# Patient Record
Sex: MALE | Race: BLACK OR AFRICAN AMERICAN | Employment: STUDENT | URBAN - METROPOLITAN AREA
[De-identification: names, ages, dates, MRNs, and addresses within clinical notes are randomized per-mention and may not be internally consistent; named-entity substitution may affect disease eponyms.]

---
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
## 2019-06-05 ENCOUNTER — TELEPHONE (OUTPATIENT)
Dept: FAMILY MEDICINE CLINIC | Facility: CLINIC | Age: 19
End: 2019-06-05

## 2019-06-05 NOTE — TELEPHONE ENCOUNTER
Called pt to inform of no show he said he was running late would be another 10-15mins, pt will call back to rsch

## 2020-05-21 ENCOUNTER — TELEPHONE (OUTPATIENT)
Dept: FAMILY MEDICINE CLINIC | Facility: CLINIC | Age: 20
End: 2020-05-21

## 2020-05-21 ENCOUNTER — APPOINTMENT (OUTPATIENT)
Dept: GENERAL RADIOLOGY | Age: 20
End: 2020-05-21
Attending: FAMILY MEDICINE
Payer: COMMERCIAL

## 2020-05-21 ENCOUNTER — HOSPITAL ENCOUNTER (OUTPATIENT)
Age: 20
Discharge: HOME | End: 2020-05-21
Attending: FAMILY MEDICINE
Payer: COMMERCIAL

## 2020-05-21 VITALS
RESPIRATION RATE: 16 BRPM | WEIGHT: 170 LBS | HEART RATE: 49 BPM | BODY MASS INDEX: 25.18 KG/M2 | DIASTOLIC BLOOD PRESSURE: 76 MMHG | TEMPERATURE: 98 F | HEIGHT: 69 IN | OXYGEN SATURATION: 100 % | SYSTOLIC BLOOD PRESSURE: 120 MMHG

## 2020-05-21 DIAGNOSIS — M79.89 SWELLING OF THIGH: ICD-10-CM

## 2020-05-21 DIAGNOSIS — S76.309A HAMSTRING INJURY, INITIAL ENCOUNTER: Primary | ICD-10-CM

## 2020-05-21 PROCEDURE — 99203 OFFICE O/P NEW LOW 30 MIN: CPT

## 2020-05-21 PROCEDURE — 73502 X-RAY EXAM HIP UNI 2-3 VIEWS: CPT | Performed by: FAMILY MEDICINE

## 2020-05-21 PROCEDURE — 73552 X-RAY EXAM OF FEMUR 2/>: CPT | Performed by: FAMILY MEDICINE

## 2020-05-21 PROCEDURE — 99214 OFFICE O/P EST MOD 30 MIN: CPT

## 2020-05-21 NOTE — TELEPHONE ENCOUNTER
Stuck leg out to block a ball - playing soccer morning - this happen about 10 AM this morning. Pt heard a loud pop. Could not move or walk after that. Having a hard time getting around.    He is applying ice to the leg and also elevating it  Taking ibupro

## 2020-05-21 NOTE — TELEPHONE ENCOUNTER
Pt left a voicemail asking for an appointment. He thinks he torn his hamstring.  (no appointments left for Dr Leslie Lewis today. Please advise.

## 2020-05-21 NOTE — ED PROVIDER NOTES
Patient Seen in: [facility] [place]      History   Patient presents with:  Leg Pain    Stated Complaint: leg pain    HPI  This is a 16-year-old male coming in with complaints of hearing a/feeling a pop with posterior right leg / thigh - distress  SKIN: no rashes,no suspicious lesions noted over the exposed areas of skin   HEENT: atraumatic, normocephalic  NECK: supple  LUNGS: No tachypnea   CARDIO: No tachycardia   GI: not distended     EXTREMITIES:   R leg / thigh exam:   - defect/deform some tingling to the foot.                           Order Specific Question: What is the Relevant Clinical Indication / Reason for Exam?          Answer: leg pain      Apply ace wrap Once      Crutch walk training          Order Comments:              Crut to evaluate the hamstrings further. Patient verbalized understanding and agreed with the plan. MDM     Your XRs were normal - no bony abnormality noted. Remove the ace wrap at bed time or may loosely wrap the muscle at night time.  Avoid beari

## 2020-05-21 NOTE — ED INITIAL ASSESSMENT (HPI)
Paramjit bustillo states around  he was playing soccer with friends when he did the splits and both heard and felt a pop to the posterior right leg. He has a lot of pain with trying to bend the leg and decreased ROM.   He took advil 400mg around 1130am.  He

## 2020-05-26 ENCOUNTER — OFFICE VISIT (OUTPATIENT)
Dept: FAMILY MEDICINE CLINIC | Facility: CLINIC | Age: 20
End: 2020-05-26

## 2020-05-26 VITALS
HEART RATE: 57 BPM | HEIGHT: 69 IN | DIASTOLIC BLOOD PRESSURE: 68 MMHG | TEMPERATURE: 98 F | OXYGEN SATURATION: 98 % | SYSTOLIC BLOOD PRESSURE: 110 MMHG | WEIGHT: 171 LBS | BODY MASS INDEX: 25.33 KG/M2 | RESPIRATION RATE: 18 BRPM

## 2020-05-26 DIAGNOSIS — S76.301D RIGHT HAMSTRING INJURY, SUBSEQUENT ENCOUNTER: Primary | ICD-10-CM
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
## 2020-05-26 PROBLEM — S76.301A RIGHT HAMSTRING INJURY: Status: ACTIVE | Noted: 2020-05-26

## 2020-05-26 PROCEDURE — 99213 OFFICE O/P EST LOW 20 MIN: CPT | Performed by: FAMILY MEDICINE

## 2020-05-26 NOTE — PROGRESS NOTES
Theresa Rodriguez is a 20year old male. Patient presents with:  Lower Extremity Injury: right    HPI:   Theresa Rodriguez is a 20year old male seen for follow up from urgent care.     Patient states was playing soccer 5/21/2020 and was trying to block his friend wit thigh with mild tenderness to palpation. Knee extension and flexion is restricted due to pain  Hip flexion and extension is restricted due to pain. ASSESSMENT AND PLAN:   Chris Gross was seen today for lower extremity injury.     Diagnoses and all orders for t

## 2020-05-28 ENCOUNTER — HOSPITAL ENCOUNTER (OUTPATIENT)
Dept: MRI IMAGING | Facility: HOSPITAL | Age: 20
Discharge: HOME | End: 2020-05-28
Attending: FAMILY MEDICINE
Payer: COMMERCIAL

## 2020-05-28 DIAGNOSIS — S76.301D RIGHT HAMSTRING INJURY, SUBSEQUENT ENCOUNTER: ICD-10-CM
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
## 2020-05-28 PROCEDURE — 73718 MRI LOWER EXTREMITY W/O DYE: CPT | Performed by: FAMILY MEDICINE

## 2020-06-01 ENCOUNTER — OFFICE VISIT (OUTPATIENT)
Dept: ORTHOPEDICS CLINIC | Facility: CLINIC | Age: 20
End: 2020-06-01

## 2020-06-01 VITALS
HEART RATE: 60 BPM | OXYGEN SATURATION: 99 % | HEIGHT: 69 IN | RESPIRATION RATE: 16 BRPM | WEIGHT: 171 LBS | BODY MASS INDEX: 25.33 KG/M2

## 2020-06-01 DIAGNOSIS — S76.311A COMPLETE PROXIMAL HAMSTRING TENDON RUPTURE, RIGHT, INITIAL ENCOUNTER: Primary | ICD-10-CM

## 2020-06-01 DIAGNOSIS — M25.551 PAIN IN JOINT OF RIGHT HIP: ICD-10-CM

## 2020-06-01 PROCEDURE — 99203 OFFICE O/P NEW LOW 30 MIN: CPT | Performed by: NURSE PRACTITIONER

## 2020-06-01 NOTE — H&P
[facility] New Patient Note    CC: Patient presents with:  Consult: right hamstring injury. heard a pop. 1.5 weeks ago. denies radiation. denies numbness or tingling. RICE helps with pain. no longer taking NSAIDS.         HPI: This 20year old male pr tuberosity. There is swelling and bruising over the proximal hamstring with a negative straight leg raise, and he is neurovascular intact distally.     IMAGING  I personally reviewed his x-rays and his MRI today's x-rays were negative for any acute process

## 2020-06-02 ENCOUNTER — OFFICE VISIT (OUTPATIENT)
Dept: PHYSICAL THERAPY | Facility: HOSPITAL | Age: 20
End: 2020-06-02
Attending: FAMILY MEDICINE
Payer: COMMERCIAL

## 2020-06-02 DIAGNOSIS — S76.301D RIGHT HAMSTRING INJURY, SUBSEQUENT ENCOUNTER: ICD-10-CM

## 2020-06-02 PROCEDURE — 97110 THERAPEUTIC EXERCISES: CPT

## 2020-06-02 PROCEDURE — 97161 PT EVAL LOW COMPLEX 20 MIN: CPT

## 2020-06-02 NOTE — PROGRESS NOTES
LOWER EXTREMITY EVALUATION:   Referring Physician: Dr. Du Rowe  Diagnosis: R Hamstring Injury     Date of Service: 6/2/2020     PATIENT SUMMARY   Julissa Ortiz is a 20year old male who presents to therapy today with complaints of right leg pain followin you tried to hurt yourself in the past?  No        Juan M Zuniga describes prior level of function no history of HS injury, pt was able to play collegiate soccer. Pt goals include be able to flex hip, improve strength, play soccer.   Past medical history was reviewe 5/5; L 5/5 Flexion: R 3/5; L 5/5  Extension: R 5/5; L 5/5        Special tests:   SLR - for radicular symptoms, localized pulling in HS with this  Pt is unable to raise hip into extension against gravity  Lumbar screen: trunk flexion to 70, pulling in LEs Manual Therapy, Neuromuscular Re-education, Therapeutic Activities, Therapeutic Exercise, Home Exercise Program instruction and Modalities to include: ice    Education or treatment limitation: None  Rehab Potential:good    Patient/Family/Caregiver was advi

## 2020-06-04 ENCOUNTER — APPOINTMENT (OUTPATIENT)
Dept: PHYSICAL THERAPY | Facility: HOSPITAL | Age: 20
End: 2020-06-04
Attending: FAMILY MEDICINE
Payer: COMMERCIAL

## 2020-06-05 ENCOUNTER — OFFICE VISIT (OUTPATIENT)
Dept: PHYSICAL THERAPY | Facility: HOSPITAL | Age: 20
End: 2020-06-05
Attending: FAMILY MEDICINE
Payer: COMMERCIAL

## 2020-06-05 PROCEDURE — 97110 THERAPEUTIC EXERCISES: CPT

## 2020-06-05 NOTE — PROGRESS NOTES
Dx: R Hamstring Injury         Insurance (Authorized # of Visits):  12           Authorizing Physician: Dr. Shell Bowen  Next MD visit: none scheduled  Fall Risk: standard         Precautions: n/a             Subjective:  The patient states that he is now able R hip extension, 2x8  Bridge standard 10x, progressed to bridge with march 2x12  Reviewed mini squat 10x       X       HEP:Access Code: [email or web address]   Prone Knee Flexion - 10 reps - 3 sets - 1x daily - 7x weekly  Sidelying Hip Extension in Abduction - 8 reps -

## 2020-06-09 ENCOUNTER — OFFICE VISIT (OUTPATIENT)
Dept: PHYSICAL THERAPY | Facility: HOSPITAL | Age: 20
End: 2020-06-09
Attending: FAMILY MEDICINE
Payer: COMMERCIAL

## 2020-06-09 DIAGNOSIS — S76.301D RIGHT HAMSTRING INJURY, SUBSEQUENT ENCOUNTER: ICD-10-CM

## 2020-06-09 PROCEDURE — 97110 THERAPEUTIC EXERCISES: CPT

## 2020-06-09 NOTE — PROGRESS NOTES
Dx: R Hamstring Injury         Insurance (Authorized # of Visits):  12           Authorizing Physician: Dr. Nish Jorge  Next MD visit: none scheduled  Fall Risk: standard         Precautions: n/a             Subjective:  The patient reports that he has been w treadmill 2 mph, no elevation 3 min  Prone glute max hip extension 10x  Prone hip extension 2x, DC due to pain  Prone HS curl no resistance 3x10  Swiss ball DKTC 15x, slow eccentric control  sidelying R hip extension, 2x8  Bridge standard 10x, progressed t

## 2020-06-11 ENCOUNTER — OFFICE VISIT (OUTPATIENT)
Dept: PHYSICAL THERAPY | Facility: HOSPITAL | Age: 20
End: 2020-06-11
Attending: FAMILY MEDICINE
Payer: COMMERCIAL

## 2020-06-11 DIAGNOSIS — S76.301D RIGHT HAMSTRING INJURY, SUBSEQUENT ENCOUNTER: ICD-10-CM

## 2020-06-11 PROCEDURE — 97140 MANUAL THERAPY 1/> REGIONS: CPT

## 2020-06-11 PROCEDURE — 97110 THERAPEUTIC EXERCISES: CPT

## 2020-06-11 NOTE — PROGRESS NOTES
Dx: R Hamstring Injury         Insurance (Authorized # of Visits):  12           Authorizing Physician: Dr. Leonard Harmon  Next MD visit: none scheduled  Fall Risk: standard         Precautions: n/a             Subjective:  The patient states that he is still ha 6/11/2020            TX#: 4/16 Date:                 TX#: 5/ Date:    Tx#: 6/   There ex  Ambulation on treadmill 2 mph, no elevation 3 min  Prone glute max hip extension 10x  Prone hip extension 2x, DC due to pain  Prone HS curl no resistance 3x10  Swiss b

## 2020-06-16 ENCOUNTER — OFFICE VISIT (OUTPATIENT)
Dept: PHYSICAL THERAPY | Facility: HOSPITAL | Age: 20
End: 2020-06-16
Attending: FAMILY MEDICINE
Payer: COMMERCIAL

## 2020-06-16 DIAGNOSIS — S76.301D RIGHT HAMSTRING INJURY, SUBSEQUENT ENCOUNTER: ICD-10-CM

## 2020-06-16 PROCEDURE — 97140 MANUAL THERAPY 1/> REGIONS: CPT

## 2020-06-16 PROCEDURE — 97110 THERAPEUTIC EXERCISES: CPT

## 2020-06-16 NOTE — PROGRESS NOTES
Dx: R Hamstring Injury         Insurance (Authorized # of Visits):  12           Authorizing Physician: Dr. Lisbeth Bonds  Next MD visit: none scheduled  Fall Risk: standard         Precautions: n/a             Subjective:  The patient reports that he is feeling elevation 3 min  Prone glute max hip extension 10x  Prone hip extension 2x, DC due to pain  Prone HS curl no resistance 3x10  Swiss ball DKTC 15x, slow eccentric control  sidelying R hip extension, 2x8  Bridge standard 10x, progressed to bridge with march

## 2020-06-18 ENCOUNTER — OFFICE VISIT (OUTPATIENT)
Dept: PHYSICAL THERAPY | Facility: HOSPITAL | Age: 20
End: 2020-06-18
Attending: FAMILY MEDICINE
Payer: COMMERCIAL

## 2020-06-18 DIAGNOSIS — S76.301D RIGHT HAMSTRING INJURY, SUBSEQUENT ENCOUNTER: ICD-10-CM

## 2020-06-18 PROCEDURE — 97110 THERAPEUTIC EXERCISES: CPT

## 2020-06-18 PROCEDURE — 97140 MANUAL THERAPY 1/> REGIONS: CPT

## 2020-06-18 NOTE — PROGRESS NOTES
Dx: R Hamstring Injury         Insurance (Authorized # of Visits):  12           Authorizing Physician: Dr. Sarah Jason MD visit: none scheduled  Fall Risk: standard         Precautions: n/a             Subjective:  The patient states that he has been fe safely    Plan: continue RDL  Date: 6/5/2020  TX#: 2/16 Date: 6/9/2020               TX#: 3/16 Date: 6/11/2020            TX#: 4/16 Date: 6/16/2020            TX#: 5/16 Date: 6/18/2020   Tx#: 6/16   There ex  Ambulation on treadmill 2 mph, no elevation 3 m weekly- modified range  Sidelying Hip Extension in Abduction - 8 reps - 3 sets - 1x daily - 7x weekly  Single Leg Bridge - 8 reps - 3 sets - 1x daily - 7x weekly- modified to 3 sets of 6  Mini Squat with Counter Support - 10 reps - 2 sets - 1x daily - 7x w

## 2020-06-23 ENCOUNTER — APPOINTMENT (OUTPATIENT)
Dept: PHYSICAL THERAPY | Facility: HOSPITAL | Age: 20
End: 2020-06-23
Attending: FAMILY MEDICINE
Payer: COMMERCIAL

## 2020-06-25 ENCOUNTER — OFFICE VISIT (OUTPATIENT)
Dept: PHYSICAL THERAPY | Facility: HOSPITAL | Age: 20
End: 2020-06-25
Attending: FAMILY MEDICINE
Payer: COMMERCIAL

## 2020-06-25 DIAGNOSIS — S76.301D RIGHT HAMSTRING INJURY, SUBSEQUENT ENCOUNTER: ICD-10-CM

## 2020-06-25 PROCEDURE — 97140 MANUAL THERAPY 1/> REGIONS: CPT

## 2020-06-25 PROCEDURE — 97110 THERAPEUTIC EXERCISES: CPT

## 2020-06-25 NOTE — PROGRESS NOTES
Detail Level: Detailed Progress Summary  Pt has attended 7 visits in Physical Therapy.      Dx: R Hamstring Injury         Insurance (Authorized # of Visits):  12           Authorizing Physician: Dr. James Lee  Next MD visit: none scheduled  Fall Risk: standard         Precaution Add 53370 Cpt? (Important Note: In 2017 The Use Of 95571 Is Being Tracked By Cms To Determine Future Global Period Reimbursement For Global Periods): yes patient will demonstrate prone knee flexion strength on R that is 5/5  PROGRESSING  4. The patient will demonstrate the ability to flex lumbar spine to at least 90 deg in standing with no increase in pain NOT TESTED  5.  The patient will demonstrate normal pain     There ex  Bike 5 min L3  sidelying adduction no resistance 10x on L, 3x on R  Sumo squat 2x12  SL bridge 2x8 ea  Tried hip extension lean on table, DC due to pain  Standing hip extension partial range no resistance, 2x10 ea  BOSU step ups flat emily

## 2020-06-29 ENCOUNTER — OFFICE VISIT (OUTPATIENT)
Dept: ORTHOPEDICS CLINIC | Facility: CLINIC | Age: 20
End: 2020-06-29

## 2020-06-29 ENCOUNTER — OFFICE VISIT (OUTPATIENT)
Dept: PHYSICAL THERAPY | Facility: HOSPITAL | Age: 20
End: 2020-06-29
Attending: FAMILY MEDICINE
Payer: COMMERCIAL

## 2020-06-29 VITALS — HEART RATE: 68 BPM | OXYGEN SATURATION: 97 % | RESPIRATION RATE: 16 BRPM

## 2020-06-29 DIAGNOSIS — S76.311A COMPLETE PROXIMAL HAMSTRING TENDON RUPTURE, RIGHT, INITIAL ENCOUNTER: Primary | ICD-10-CM

## 2020-06-29 PROCEDURE — 99213 OFFICE O/P EST LOW 20 MIN: CPT | Performed by: NURSE PRACTITIONER

## 2020-06-29 PROCEDURE — 97140 MANUAL THERAPY 1/> REGIONS: CPT

## 2020-06-29 PROCEDURE — 97110 THERAPEUTIC EXERCISES: CPT

## 2020-06-29 NOTE — PROGRESS NOTES
[facility] Progress Note    CC: Patient presents with: Follow - Up: 4 week follow up rt hamstring injury. Currently going to PT. No more pain       HPI: This 20year old male presents today with complaints of right proximal hamstring rupture.   Patient

## 2020-06-29 NOTE — PROGRESS NOTES
Dx: R Hamstring Injury         Insurance (Authorized # of Visits):  12           Authorizing Physician: Dr. Arleth Jason MD visit: none scheduled  Fall Risk: standard         Precautions: n/a             Subjective:   Patient reports that his leg is feel thigh pain to cross the street safely PROGRESSING        Plan: progress posterior chain strength as able  Date: 6/5/2020  TX#: 2/16 Date: 6/9/2020               TX#: 3/16 Date: 6/11/2020            TX#: 4/16 Date: 6/16/2020           TX#: 5/16 Date: 6/18/2 L3  TRX HS curl and bridge 3x8  Shuttle quadruped hip extension, 37#, 2x10  Bridge LEs on box 3x8  TRX split squat, 15  SL RDL holding 2 10# weights 2x10  Progressed prone HS curl to red TB, practiced 12 reps   X Man there  STM to hamstrings for pain relie

## 2020-07-01 ENCOUNTER — OFFICE VISIT (OUTPATIENT)
Dept: PHYSICAL THERAPY | Facility: HOSPITAL | Age: 20
End: 2020-07-01
Attending: FAMILY MEDICINE
Payer: COMMERCIAL

## 2020-07-01 PROCEDURE — 97140 MANUAL THERAPY 1/> REGIONS: CPT

## 2020-07-01 PROCEDURE — 97110 THERAPEUTIC EXERCISES: CPT

## 2020-07-01 NOTE — PROGRESS NOTES
Dx: R Hamstring Injury         Insurance (Authorized # of Visits):  12           Authorizing Physician: Dr. Theresa Franklin  Next MD visit: none scheduled  Fall Risk: standard         Precautions: n/a             Subjective:   Patient did not have any soreness af 6/9/2020               TX#: 3/16 Date: 6/11/2020            TX#: 4/16 Date: 6/16/2020           TX#: 5/16 Date: 6/18/2020   Tx#: 6/16 Date: 6/25/2020   Tx#: 7/16 6/29/30  Tx#: 8/16 Date: 7/1/20  Tx#: 9/16   There ex  Ambulation on treadmill 2 mph, no eleva 15  SL RDL holding 2 10# weights 2x10  Progressed prone HS curl to red TB, practiced 12 reps There ex  Bike 4 min L3  Single leg squat to table (50 cm), 3x5  Shuttle single leg hop, 50#, 2x15  Cable HS curl standing, 25#, 2x12  Nordic curl, 3x5 (partial RO

## 2020-07-08 ENCOUNTER — APPOINTMENT (OUTPATIENT)
Dept: PHYSICAL THERAPY | Facility: HOSPITAL | Age: 20
End: 2020-07-08
Attending: FAMILY MEDICINE
Payer: COMMERCIAL

## 2020-07-10 ENCOUNTER — OFFICE VISIT (OUTPATIENT)
Dept: PHYSICAL THERAPY | Facility: HOSPITAL | Age: 20
End: 2020-07-10
Attending: FAMILY MEDICINE
Payer: COMMERCIAL

## 2020-07-10 PROCEDURE — 97110 THERAPEUTIC EXERCISES: CPT

## 2020-07-10 NOTE — PROGRESS NOTES
Dx: R Hamstring Injury         Insurance (Authorized # of Visits):  12           Authorizing Physician: Dr. Sharon Jason MD visit: none scheduled  Fall Risk: standard         Precautions: n/a             Subjective:  The patient states that he is doing be in thigh pain to cross the street safely PROGRESSING      Plan: nordic curls  Date: 6/16/2020           TX#: 5/16 Date: 6/18/2020   Tx#: 6/16 Date: 6/25/2020   Tx#: 7/16 6/29/30  Tx#: 8/16 Date: 7/1/20  Tx#: 9/16 Date: 7/10/20  Tx#: 10/16   There ex  Ellip pain relief, 9 min Man there  STM to hamstrings for pain relief, 8 min Man there  STM to hamstrings for pain relief, 10 min Man there  STM to hamstrings for pain relief, 5 min   Ice 10 min, unbilled Ice 10 min, unbilled Ice 10 min, unbilled      HEP:  [employer]

## 2020-07-14 ENCOUNTER — APPOINTMENT (OUTPATIENT)
Dept: PHYSICAL THERAPY | Facility: HOSPITAL | Age: 20
End: 2020-07-14
Attending: FAMILY MEDICINE
Payer: COMMERCIAL

## 2020-07-17 ENCOUNTER — OFFICE VISIT (OUTPATIENT)
Dept: PHYSICAL THERAPY | Facility: HOSPITAL | Age: 20
End: 2020-07-17
Attending: FAMILY MEDICINE
Payer: COMMERCIAL

## 2020-07-17 PROCEDURE — 97110 THERAPEUTIC EXERCISES: CPT

## 2020-07-17 NOTE — PROGRESS NOTES
Dx: R Hamstring Injury         Insurance (Authorized # of Visits):  12           Authorizing Physician: Dr. Shell Bowen  Next MD visit: none scheduled  Fall Risk: standard         Precautions: n/a             Subjective:  The patient reports that he was able t patient will demonstrate normal gait mechanics including equal stance time bilaterally MET  6. The patient will report being able to put shoes and socks on without difficulty MET  7.  The patient will report being able to jog 50 feet without increase in [facility] holding 15# in air 10x ea  SL hop landing, 5x ea forward/retro and lateral   Jumping down from 6\" box 5x  Walking RDL stretch 6x ea There ex  Bike 4 min L3  Dynamic HS stretching 2x  Hip opening lunge stretch 4x  Walking lunge 2 15# weights 2 laps  Hip ad

## 2020-07-24 ENCOUNTER — TELEPHONE (OUTPATIENT)
Dept: FAMILY MEDICINE CLINIC | Facility: CLINIC | Age: 20
End: 2020-07-24

## 2020-07-24 ENCOUNTER — OFFICE VISIT (OUTPATIENT)
Dept: PHYSICAL THERAPY | Facility: HOSPITAL | Age: 20
End: 2020-07-24
Attending: FAMILY MEDICINE
Payer: COMMERCIAL

## 2020-07-24 DIAGNOSIS — S76.301D RIGHT HAMSTRING INJURY, SUBSEQUENT ENCOUNTER: Primary | ICD-10-CM

## 2020-07-24 PROCEDURE — 97110 THERAPEUTIC EXERCISES: CPT

## 2020-07-24 NOTE — PROGRESS NOTES
Progress Summary  Pt has attended 12 visits in Physical Therapy.      Dx: R Hamstring Injury         Insurance (Authorized # of Visits):  12           Authorizing Physician: Dr. Michele Burgess  Next MD visit: none scheduled  Fall Risk: standard         Precautio to perform prone hip extension AROM MET  3. The patient will demonstrate prone knee flexion strength on R that is 5/5  PROGRESSING  4.  The patient will demonstrate the ability to flex lumbar spine to at least 90 deg in standing with no increase in pain MET 3x5 (partial ROM)  Walking RDL for active stretch, 10 There ex  Bike 4 min L3  High step up black step with high knee 10x ea  Sumo squat kettle bell with 3 weights in it 3x6  Cable HS curl standing, 25#, 2x10  Walking lunge holding 2 15# dumbbells 2 laps 1

## 2020-07-27 ENCOUNTER — OFFICE VISIT (OUTPATIENT)
Dept: ORTHOPEDICS CLINIC | Facility: CLINIC | Age: 20
End: 2020-07-27

## 2020-07-27 VITALS — HEART RATE: 50 BPM | OXYGEN SATURATION: 99 %

## 2020-07-27 DIAGNOSIS — S76.311A COMPLETE PROXIMAL HAMSTRING TENDON RUPTURE, RIGHT, INITIAL ENCOUNTER: Primary | ICD-10-CM

## 2020-07-27 PROCEDURE — 99213 OFFICE O/P EST LOW 20 MIN: CPT | Performed by: NURSE PRACTITIONER

## 2020-07-27 NOTE — PROGRESS NOTES
[facility] Progress Note    CC: Patient presents with: Follow - Up: 4 week follow up hamstring injury    HPI: This 20year old male presents today with complaints of right proximal hamstring rupture.   Patient is now almost 12weeks out of injury he h

## 2020-07-30 ENCOUNTER — OFFICE VISIT (OUTPATIENT)
Dept: PHYSICAL THERAPY | Facility: HOSPITAL | Age: 20
End: 2020-07-30
Attending: FAMILY MEDICINE
Payer: COMMERCIAL

## 2020-07-30 PROCEDURE — 97110 THERAPEUTIC EXERCISES: CPT

## 2020-07-30 PROCEDURE — 97140 MANUAL THERAPY 1/> REGIONS: CPT

## 2020-07-31 NOTE — PROGRESS NOTES
Dx: R Hamstring Injury         Insurance (Authorized # of Visits):  12           Authorizing Physician: Dr. Di Jason MD visit: none scheduled  Fall Risk: standard         Precautions: n/a             Subjective:  The patient reports that he will be pl patient will report being able to put shoes and socks on without difficulty MET  7.  The patient will report being able to jog 50 feet without increase in thigh pain to cross the street safely MET    Plan: nordic curls  6/29/30  Tx#: 8/16 Date: 7/1/20  Tx#: VU knee extension on sliders 2x5   Man there  STM to hamstrings for pain relief, 8 min Man there  STM to hamstrings for pain relief, 10 min Man there  STM to hamstrings for pain relief, 5 min X X Man there  STM to hamstrings for pain relief, 5 min  STM to

## 2020-08-07 ENCOUNTER — OFFICE VISIT (OUTPATIENT)
Dept: PHYSICAL THERAPY | Facility: HOSPITAL | Age: 20
End: 2020-08-07
Attending: FAMILY MEDICINE
Payer: COMMERCIAL

## 2020-08-07 PROCEDURE — 97110 THERAPEUTIC EXERCISES: CPT

## 2020-08-07 NOTE — PROGRESS NOTES
Dx: R Hamstring Injury         Insurance (Authorized # of Visits):  12           Authorizing Physician: Dr. Lucia Moreau  Next MD visit: none scheduled  Fall Risk: standard         Precautions: n/a             Subjective:  The patient states that he has practic 11/16 Date: 7/24/20  Tx#: 12/16 Date: 7/30/20  Tx#: 13/16 Date: 8/7/20  Tx#: 14/16   There ex  Bike 4 min L3  Single leg squat to table (50 cm), 3x5  Shuttle single leg hop, 50#, 2x15  Cable HS curl standing, 25#, 2x12  Nordic curl, 3x5 (partial ROM)  Walk Exercises  Prone Knee Flexion - 10 reps - 3 sets - 1x daily - 7x weekly- modified range  Sidelying Hip Extension in Abduction - 8 reps - 3 sets - 1x daily - 7x weekly  Single Leg Bridge - 8 reps - 3 sets - 1x daily - 7x weekly- modified to 3 sets of 6  M

## 2020-08-12 ENCOUNTER — OFFICE VISIT (OUTPATIENT)
Dept: PHYSICAL THERAPY | Facility: HOSPITAL | Age: 20
End: 2020-08-12
Attending: FAMILY MEDICINE
Payer: COMMERCIAL

## 2020-08-12 PROCEDURE — 97110 THERAPEUTIC EXERCISES: CPT

## 2020-08-13 NOTE — PROGRESS NOTES
Discharge Summary  Pt has attended 15 visits in Physical Therapy.      Dx: R Hamstring Injury         Insurance (Authorized # of Visits):  12           Authorizing Physician: Dr. James Lee  Next MD visit: none scheduled  Fall Risk: standard         Bailey Hof to cross the street safely MET      Plan: The patient will be discharged from this plan of care for physical therapy at this time.     Patient/Family/Caregiver was advised of these findings, precautions, and treatment options and has agreed to actively part with half [place] slider 2x6 ea  Tandem on flat side of BOSU with ball catches 10x ea  RDL 2 15# weights 2x10 ea  SL bridge foot on slider, knee extension 5x ea   There ex  SL bridge 8x ea  Bridge on heels with walk out 8x  Bridge feet on foam roller, 8x  B

## 2020-09-03 NOTE — TELEPHONE ENCOUNTER
Problem: Falls - Risk of:  Goal: Will remain free from falls  Description: Will remain free from falls  Outcome: Met This Shift     Problem: Falls - Risk of:  Goal: Absence of physical injury  Description: Absence of physical injury  Outcome: Met This Shift     Problem: Pain:  Goal: Pain level will decrease  Description: Pain level will decrease  Outcome: Met This Shift     Problem: Pain:  Goal: Control of acute pain  Description: Control of acute pain  Outcome: Met This Shift     Problem: Pain:  Goal: Control of chronic pain  Description: Control of chronic pain  Outcome: Met This Shift     Problem: Skin Integrity:  Goal: Will show no infection signs and symptoms  Description: Will show no infection signs and symptoms  Outcome: Met This Shift     Problem: Skin Integrity:  Goal: Absence of new skin breakdown  Description: Absence of new skin breakdown  Outcome: Met This Shift Pt's brother walked in with form for Dr. Erazo to complete. (Signature)    Pt canceled last appointment for physical. Currently pt is in [place].    Pt paper on Dr. Chika rodriguez.

## 2021-01-19 ENCOUNTER — HOSPITAL ENCOUNTER (OUTPATIENT)
Age: 21
Discharge: HOME | End: 2021-01-19
Payer: COMMERCIAL

## 2021-01-19 ENCOUNTER — TELEPHONE (OUTPATIENT)
Dept: FAMILY MEDICINE CLINIC | Facility: CLINIC | Age: 21
End: 2021-01-19

## 2021-01-19 VITALS
RESPIRATION RATE: 16 BRPM | DIASTOLIC BLOOD PRESSURE: 73 MMHG | HEART RATE: 60 BPM | BODY MASS INDEX: 25.05 KG/M2 | TEMPERATURE: 98 F | HEIGHT: 70 IN | WEIGHT: 175 LBS | SYSTOLIC BLOOD PRESSURE: 124 MMHG | OXYGEN SATURATION: 98 %

## 2021-01-19 DIAGNOSIS — T78.40XA ALLERGIC REACTION, INITIAL ENCOUNTER: ICD-10-CM

## 2021-01-19 DIAGNOSIS — R22.0 SWELLING OF BOTH LIPS: ICD-10-CM

## 2021-01-19 DIAGNOSIS — K12.0 APHTHOUS ULCER: Primary | ICD-10-CM

## 2021-01-19 PROCEDURE — 99213 OFFICE O/P EST LOW 20 MIN: CPT | Performed by: NURSE PRACTITIONER

## 2021-01-19 RX ORDER — LIDOCAINE HYDROCHLORIDE 20 MG/ML
5 SOLUTION OROPHARYNGEAL
Qty: 100 ML | Refills: 0 | Status: SHIPPED | OUTPATIENT
Start: 2021-01-19

## 2021-01-19 RX ORDER — DIPHENHYDRAMINE HCL 25 MG
25 CAPSULE ORAL ONCE
Status: COMPLETED | OUTPATIENT
Start: 2021-01-19 | End: 2021-01-19

## 2021-01-19 RX ORDER — PREDNISONE 20 MG/1
60 TABLET ORAL ONCE
Status: COMPLETED | OUTPATIENT
Start: 2021-01-19 | End: 2021-01-19

## 2021-01-19 RX ORDER — PREDNISONE 20 MG/1
40 TABLET ORAL DAILY
Qty: 8 TABLET | Refills: 0 | Status: SHIPPED | OUTPATIENT
Start: 2021-01-19 | End: 2021-01-23

## 2021-01-19 NOTE — TELEPHONE ENCOUNTER
Detailed VMML for patient's mother advising patient push fluids, warm tea w honey/lemon, salt water gargle, steam tx, OTC throat lozenge or spray. Requested return call with more symptom detail or go to IC for evaluation and treatment.

## 2021-01-19 NOTE — TELEPHONE ENCOUNTER
Pt's mom called, pt c/o sore throat, runny nose, swollen lips and sores in mouth, started last night, no fever requesting to sp w/ nurse

## 2021-01-19 NOTE — ED PROVIDER NOTES
Patient Seen in: [facility]mediate [address] [place] [address]      History   Patient presents with:  Mouth/Lip Problem    Stated Complaint: SWOLLEN LIPS / [address] [place]    HPI/Subjective:   40-year-old male presents the immediate care for lip swelling and s Ht 177.8 cm (5' 10\")   Wt 79.4 kg   SpO2 98%   BMI 25.11 kg/m²         Physical Exam  Vitals signs and nursing note reviewed. Constitutional:       General: He is not in acute distress. Appearance: Normal appearance. He is not ill-appearing.    HENT: daily for 4 days. Qty: 8 tablet Refills: 0    Lidocaine Viscous HCl 2 % Mouth/Throat Solution  Take 5 mL by mouth every 3 (three) hours as needed for Pain.  Swish and spit  Qty: 100 mL Refills: 0

## 2021-01-19 NOTE — ED INITIAL ASSESSMENT (HPI)
Patient is here for evaluation of swollen lips with sores to the lips and inside the mouth. He noticed the sores last night and the lip swelling today. He has had a slight sore throat that started last night.  Denies any tongue or throat swelling, difficult

## 2021-01-21 ENCOUNTER — TELEPHONE (OUTPATIENT)
Dept: FAMILY MEDICINE CLINIC | Facility: CLINIC | Age: 21
End: 2021-01-21

## 2021-01-21 ENCOUNTER — HOSPITAL ENCOUNTER (EMERGENCY)
Facility: HOSPITAL | Age: 21
Discharge: HOME | End: 2021-01-21
Attending: PEDIATRICS
Payer: COMMERCIAL

## 2021-01-21 VITALS
TEMPERATURE: 99 F | SYSTOLIC BLOOD PRESSURE: 128 MMHG | DIASTOLIC BLOOD PRESSURE: 80 MMHG | HEIGHT: 70 IN | BODY MASS INDEX: 24.94 KG/M2 | HEART RATE: 77 BPM | WEIGHT: 174.19 LBS | OXYGEN SATURATION: 99 % | RESPIRATION RATE: 14 BRPM

## 2021-01-21 DIAGNOSIS — B97.89 STOMATITIS, VIRAL: Primary | ICD-10-CM

## 2021-01-21 DIAGNOSIS — K12.1 STOMATITIS, VIRAL: Primary | ICD-10-CM

## 2021-01-21 LAB
ALBUMIN SERPL-MCNC: 3.8 G/DL (ref 3.4–5)
ALBUMIN/GLOB SERPL: 0.8 {RATIO} (ref 1–2)
ALP LIVER SERPL-CCNC: 82 U/L
ALT SERPL-CCNC: 17 U/L
ANION GAP SERPL CALC-SCNC: 4 MMOL/L (ref 0–18)
AST SERPL-CCNC: 17 U/L (ref 15–37)
BASOPHILS # BLD AUTO: 0.04 X10(3) UL (ref 0–0.2)
BASOPHILS NFR BLD AUTO: 0.5 %
BILIRUB SERPL-MCNC: 0.8 MG/DL (ref 0.1–2)
BUN BLD-MCNC: 13 MG/DL (ref 7–18)
BUN/CREAT SERPL: 11.1 (ref 10–20)
CALCIUM BLD-MCNC: 10.2 MG/DL (ref 8.5–10.1)
CHLORIDE SERPL-SCNC: 104 MMOL/L (ref 98–112)
CO2 SERPL-SCNC: 28 MMOL/L (ref 21–32)
CREAT BLD-MCNC: 1.17 MG/DL
CRP SERPL-MCNC: 1.65 MG/DL (ref ?–0.3)
DEPRECATED RDW RBC AUTO: 39.8 FL (ref 35.1–46.3)
EOSINOPHIL # BLD AUTO: 0.07 X10(3) UL (ref 0–0.7)
EOSINOPHIL NFR BLD AUTO: 0.8 %
ERYTHROCYTE [DISTWIDTH] IN BLOOD BY AUTOMATED COUNT: 12.2 % (ref 11–15)
GLOBULIN PLAS-MCNC: 4.8 G/DL (ref 2.8–4.4)
GLUCOSE BLD-MCNC: 84 MG/DL (ref 70–99)
HCT VFR BLD AUTO: 45.8 %
HGB BLD-MCNC: 15.6 G/DL
IMM GRANULOCYTES # BLD AUTO: 0.03 X10(3) UL (ref 0–1)
IMM GRANULOCYTES NFR BLD: 0.3 %
LYMPHOCYTES # BLD AUTO: 1.57 X10(3) UL (ref 1–4)
LYMPHOCYTES NFR BLD AUTO: 18 %
M PROTEIN MFR SERPL ELPH: 8.6 G/DL (ref 6.4–8.2)
MCH RBC QN AUTO: 30.2 PG (ref 26–34)
MCHC RBC AUTO-ENTMCNC: 34.1 G/DL (ref 31–37)
MCV RBC AUTO: 88.8 FL
MONOCYTES # BLD AUTO: 0.9 X10(3) UL (ref 0.1–1)
MONOCYTES NFR BLD AUTO: 10.3 %
NEUTROPHILS # BLD AUTO: 6.1 X10 (3) UL (ref 1.5–7.7)
NEUTROPHILS # BLD AUTO: 6.1 X10(3) UL (ref 1.5–7.7)
NEUTROPHILS NFR BLD AUTO: 70.1 %
OSMOLALITY SERPL CALC.SUM OF ELEC: 281 MOSM/KG (ref 275–295)
PLATELET # BLD AUTO: 309 10(3)UL (ref 150–450)
POTASSIUM SERPL-SCNC: 3.9 MMOL/L (ref 3.5–5.1)
RBC # BLD AUTO: 5.16 X10(6)UL
SODIUM SERPL-SCNC: 136 MMOL/L (ref 136–145)
WBC # BLD AUTO: 8.7 X10(3) UL (ref 4–11)

## 2021-01-21 PROCEDURE — 80053 COMPREHEN METABOLIC PANEL: CPT | Performed by: PEDIATRICS

## 2021-01-21 PROCEDURE — 99284 EMERGENCY DEPT VISIT MOD MDM: CPT | Performed by: PEDIATRICS

## 2021-01-21 PROCEDURE — 87430 STREP A AG IA: CPT | Performed by: PEDIATRICS

## 2021-01-21 PROCEDURE — 96374 THER/PROPH/DIAG INJ IV PUSH: CPT | Performed by: PEDIATRICS

## 2021-01-21 PROCEDURE — 96375 TX/PRO/DX INJ NEW DRUG ADDON: CPT | Performed by: PEDIATRICS

## 2021-01-21 PROCEDURE — 86140 C-REACTIVE PROTEIN: CPT | Performed by: PEDIATRICS

## 2021-01-21 PROCEDURE — 87081 CULTURE SCREEN ONLY: CPT | Performed by: PEDIATRICS

## 2021-01-21 PROCEDURE — 85025 COMPLETE CBC W/AUTO DIFF WBC: CPT | Performed by: PEDIATRICS

## 2021-01-21 PROCEDURE — 96361 HYDRATE IV INFUSION ADD-ON: CPT | Performed by: PEDIATRICS

## 2021-01-21 RX ORDER — MORPHINE SULFATE 4 MG/ML
4 INJECTION, SOLUTION INTRAMUSCULAR; INTRAVENOUS ONCE
Status: COMPLETED | OUTPATIENT
Start: 2021-01-21 | End: 2021-01-21

## 2021-01-21 RX ORDER — HYDROCODONE BITARTRATE AND ACETAMINOPHEN 5; 325 MG/1; MG/1
1 TABLET ORAL EVERY 4 HOURS PRN
Qty: 10 TABLET | Refills: 0 | Status: SHIPPED | OUTPATIENT
Start: 2021-01-21

## 2021-01-21 RX ORDER — DEXAMETHASONE SODIUM PHOSPHATE 4 MG/ML
6 VIAL (ML) INJECTION ONCE
Status: COMPLETED | OUTPATIENT
Start: 2021-01-21 | End: 2021-01-21

## 2021-01-21 NOTE — TELEPHONE ENCOUNTER
Patient's mom left vm asking if Dr. Geeta Ventura. Needs to see patient after ED visit today for a swollen lip that seems has been going on for a while.

## 2021-01-21 NOTE — ED PROVIDER NOTES
Patient Seen in: [facility] Emergency Department      History   Patient presents with:  Mouth Cold Sores  Swelling Edema    Stated Complaint: sore throat, sores in mouth and lip swelling, red tongue.   unsure of difficulty*    HPI/Subjective:   HPI Negative. All other systems reviewed and are negative. Positive for stated complaint: sore throat, sores in mouth and lip swelling, red tongue. unsure of difficulty*  Other systems are as noted in HPI. Constitutional and vital signs reviewed. Rhythm: Normal rate and regular rhythm. Pulses: Normal pulses. Heart sounds: Normal heart sounds. No murmur. No friction rub. No gallop. Pulmonary:      Effort: Pulmonary effort is normal. No respiratory distress.       Breath sounds: Normal br -----------         ------                     CBC W/ DIFFERENTIAL[[id number]]                              Final result                 Please view results for these tests on the individual orders.    [place]le[place] resolve on its own with time. I have considered other serious etiologies for this patient's complaints, however the presentation is not consistent with such entities.  Patient or caregiver understands the course of events that occurred in the emergency d

## 2021-01-21 NOTE — ED NOTES
Patient and his mother updated with results and plan for discharge. All questions answered. Educated on new prescription. Patient reports very mild decrease in pain, no change in swelling at this time. Airway remains intact. Respirations wnl.

## 2021-01-21 NOTE — TELEPHONE ENCOUNTER
Dr. Judie Del Real,  Please advise if in office or Video Visit for Monday    [facility]  1/19/21    ED  1/21/21    23year old male with worsening sore throat, oral ulcers, and lip cracking over the last 2 to 3 days.   On exam, stable vitals but does appear uncomfortable

## 2021-01-21 NOTE — ED INITIAL ASSESSMENT (HPI)
Patient here for continued swelling to lips/redness/sores/blisters, associated with low grade fever as well as sore throat. Seen at  2 days ago, taking prednisone and using lidocaine mouth wash. No cough, admits to slight shortness of breath.

## 2021-01-21 NOTE — TELEPHONE ENCOUNTER
Patient was seen in [facility] 1/19/21.     Clinical Impression:  Aphthous ulcer  (primary encounter diagnosis)  Allergic reaction, initial encounter  Swelling of both lips    START taking these medications     predniSONE 20 MG Oral Tab  Take 2 tablets (40 mg total

## 2021-01-22 NOTE — TELEPHONE ENCOUNTER
Called and spoke to patient's mother. Advised Dr. Cesia Baker would like to see patient at 2:20 pm on Monday. Asking if they can be seen today. Patient is still having a lot of pain and difficulty swallowing.   Informed Dr. Cesia Baker is not in the office toda

## 2021-01-23 ENCOUNTER — HOSPITAL ENCOUNTER (EMERGENCY)
Facility: HOSPITAL | Age: 21
Discharge: HOME | End: 2021-01-23
Attending: EMERGENCY MEDICINE
Payer: COMMERCIAL

## 2021-01-23 VITALS
RESPIRATION RATE: 16 BRPM | OXYGEN SATURATION: 98 % | DIASTOLIC BLOOD PRESSURE: 76 MMHG | WEIGHT: 175 LBS | BODY MASS INDEX: 25.92 KG/M2 | TEMPERATURE: 99 F | HEIGHT: 69 IN | SYSTOLIC BLOOD PRESSURE: 118 MMHG | HEART RATE: 72 BPM

## 2021-01-23 DIAGNOSIS — K12.1 MOUTH ULCERS: Primary | ICD-10-CM

## 2021-01-23 DIAGNOSIS — K05.10 GINGIVOSTOMATITIS: ICD-10-CM

## 2021-01-23 LAB
ALBUMIN SERPL-MCNC: 3.8 G/DL (ref 3.4–5)
ALBUMIN/GLOB SERPL: 0.8 {RATIO} (ref 1–2)
ALP LIVER SERPL-CCNC: 76 U/L
ALT SERPL-CCNC: 13 U/L
ANION GAP SERPL CALC-SCNC: 5 MMOL/L (ref 0–18)
AST SERPL-CCNC: 16 U/L (ref 15–37)
BASOPHILS # BLD AUTO: 0.03 X10(3) UL (ref 0–0.2)
BASOPHILS NFR BLD AUTO: 0.4 %
BILIRUB SERPL-MCNC: 1 MG/DL (ref 0.1–2)
BUN BLD-MCNC: 15 MG/DL (ref 7–18)
BUN/CREAT SERPL: 13.4 (ref 10–20)
CALCIUM BLD-MCNC: 9.9 MG/DL (ref 8.5–10.1)
CHLORIDE SERPL-SCNC: 104 MMOL/L (ref 98–112)
CO2 SERPL-SCNC: 30 MMOL/L (ref 21–32)
CREAT BLD-MCNC: 1.12 MG/DL
DEPRECATED RDW RBC AUTO: 38.7 FL (ref 35.1–46.3)
EOSINOPHIL # BLD AUTO: 0.09 X10(3) UL (ref 0–0.7)
EOSINOPHIL NFR BLD AUTO: 1.2 %
ERYTHROCYTE [DISTWIDTH] IN BLOOD BY AUTOMATED COUNT: 12.1 % (ref 11–15)
GLOBULIN PLAS-MCNC: 4.7 G/DL (ref 2.8–4.4)
GLUCOSE BLD-MCNC: 82 MG/DL (ref 70–99)
HCT VFR BLD AUTO: 46.1 %
HGB BLD-MCNC: 15.8 G/DL
IMM GRANULOCYTES # BLD AUTO: 0.03 X10(3) UL (ref 0–1)
IMM GRANULOCYTES NFR BLD: 0.4 %
LYMPHOCYTES # BLD AUTO: 1.44 X10(3) UL (ref 1–4)
LYMPHOCYTES NFR BLD AUTO: 18.6 %
M PROTEIN MFR SERPL ELPH: 8.5 G/DL (ref 6.4–8.2)
MCH RBC QN AUTO: 29.8 PG (ref 26–34)
MCHC RBC AUTO-ENTMCNC: 34.3 G/DL (ref 31–37)
MCV RBC AUTO: 86.8 FL
MONOCYTES # BLD AUTO: 1.07 X10(3) UL (ref 0.1–1)
MONOCYTES NFR BLD AUTO: 13.8 %
NEUTROPHILS # BLD AUTO: 5.09 X10 (3) UL (ref 1.5–7.7)
NEUTROPHILS # BLD AUTO: 5.09 X10(3) UL (ref 1.5–7.7)
NEUTROPHILS NFR BLD AUTO: 65.6 %
OSMOLALITY SERPL CALC.SUM OF ELEC: 288 MOSM/KG (ref 275–295)
PLATELET # BLD AUTO: 309 10(3)UL (ref 150–450)
POTASSIUM SERPL-SCNC: 3.8 MMOL/L (ref 3.5–5.1)
RBC # BLD AUTO: 5.31 X10(6)UL
SODIUM SERPL-SCNC: 139 MMOL/L (ref 136–145)
WBC # BLD AUTO: 7.8 X10(3) UL (ref 4–11)

## 2021-01-23 PROCEDURE — 96361 HYDRATE IV INFUSION ADD-ON: CPT

## 2021-01-23 PROCEDURE — 87070 CULTURE OTHR SPECIMN AEROBIC: CPT | Performed by: EMERGENCY MEDICINE

## 2021-01-23 PROCEDURE — S0030 INJECTION, METRONIDAZOLE: HCPCS | Performed by: EMERGENCY MEDICINE

## 2021-01-23 PROCEDURE — 87205 SMEAR GRAM STAIN: CPT | Performed by: EMERGENCY MEDICINE

## 2021-01-23 PROCEDURE — 96375 TX/PRO/DX INJ NEW DRUG ADDON: CPT

## 2021-01-23 PROCEDURE — S0030 INJECTION, METRONIDAZOLE: HCPCS

## 2021-01-23 PROCEDURE — 80053 COMPREHEN METABOLIC PANEL: CPT | Performed by: EMERGENCY MEDICINE

## 2021-01-23 PROCEDURE — 99284 EMERGENCY DEPT VISIT MOD MDM: CPT

## 2021-01-23 PROCEDURE — 85025 COMPLETE CBC W/AUTO DIFF WBC: CPT | Performed by: EMERGENCY MEDICINE

## 2021-01-23 PROCEDURE — S0077 INJECTION, CLINDAMYCIN PHOSP: HCPCS | Performed by: EMERGENCY MEDICINE

## 2021-01-23 PROCEDURE — 96367 TX/PROPH/DG ADDL SEQ IV INF: CPT

## 2021-01-23 PROCEDURE — 96365 THER/PROPH/DIAG IV INF INIT: CPT

## 2021-01-23 RX ORDER — HYDROCODONE BITARTRATE AND ACETAMINOPHEN 5; 325 MG/1; MG/1
1-2 TABLET ORAL EVERY 4 HOURS PRN
Qty: 20 TABLET | Refills: 0 | Status: SHIPPED | OUTPATIENT
Start: 2021-01-23 | End: 2021-01-30

## 2021-01-23 RX ORDER — VALACYCLOVIR HYDROCHLORIDE 1 G/1
1 TABLET, FILM COATED ORAL 2 TIMES DAILY
Qty: 8 TABLET | Refills: 0 | Status: SHIPPED | OUTPATIENT
Start: 2021-01-23 | End: 2021-07-22

## 2021-01-23 RX ORDER — METRONIDAZOLE 500 MG/100ML
INJECTION, SOLUTION INTRAVENOUS
Status: COMPLETED
Start: 2021-01-23 | End: 2021-01-23

## 2021-01-23 RX ORDER — CLINDAMYCIN HYDROCHLORIDE 300 MG/1
300 CAPSULE ORAL 3 TIMES DAILY
Qty: 30 CAPSULE | Refills: 0 | Status: SHIPPED | OUTPATIENT
Start: 2021-01-23 | End: 2021-02-02

## 2021-01-23 RX ORDER — METHYLPREDNISOLONE SODIUM SUCCINATE 125 MG/2ML
125 INJECTION, POWDER, LYOPHILIZED, FOR SOLUTION INTRAMUSCULAR; INTRAVENOUS ONCE
Status: COMPLETED | OUTPATIENT
Start: 2021-01-23 | End: 2021-01-23

## 2021-01-23 RX ORDER — KETOROLAC TROMETHAMINE 30 MG/ML
30 INJECTION, SOLUTION INTRAMUSCULAR; INTRAVENOUS ONCE
Status: COMPLETED | OUTPATIENT
Start: 2021-01-23 | End: 2021-01-23

## 2021-01-23 RX ORDER — METRONIDAZOLE 500 MG/100ML
500 INJECTION, SOLUTION INTRAVENOUS ONCE
Status: COMPLETED | OUTPATIENT
Start: 2021-01-23 | End: 2021-01-23

## 2021-01-23 RX ORDER — CLINDAMYCIN PHOSPHATE 600 MG/50ML
600 INJECTION INTRAVENOUS ONCE
Status: COMPLETED | OUTPATIENT
Start: 2021-01-23 | End: 2021-01-23

## 2021-01-23 RX ORDER — METRONIDAZOLE 500 MG/1
500 TABLET ORAL 3 TIMES DAILY
Qty: 30 TABLET | Refills: 0 | Status: SHIPPED | OUTPATIENT
Start: 2021-01-23 | End: 2021-02-02

## 2021-01-23 NOTE — ED NOTES
Round on pt. Mom remains at bedside. No distress noted. Pt and Mom updated on possible adm. Pt denies any needs at this time.

## 2021-01-23 NOTE — ED NOTES
Round on pt. No distress noted. Pt sts he is feeling much better. MD updated. Will hold for 30 mins, monitoring for allergic reaction.

## 2021-01-23 NOTE — ED PROVIDER NOTES
Patient Seen in: [facility] Emergency Department      History   Patient presents with:  Sore Throat    Stated Complaint: sore throat complaints of blood in sputum with sores in his mouth.  Was seen in *    HPI/Subjective:   HPI    71-year-old male pr Physical Exam    All measures to prevent infection transmission during my interaction with the patient were taken. The patient was already wearing a droplet mask on my arrival to the room.  Personal protective equipment including droplet mask, eye protectio Procedure                               Abnormality         Status                     ---------                               -----------         ------                     CBC W/ DIFFERENTIAL[[id number]]          Abnormal            Final result Take 1 capsule (300 mg total) by mouth 3 (three) times daily for 10 days. , Normal, Disp-30 capsule, R-0    metRONIDAZOLE 500 MG Oral Tab  Take 1 tablet (500 mg total) by mouth 3 (three) times daily for 10 days. , Normal, Disp-30 tablet, R-0    !! HYDROcodon

## 2021-01-23 NOTE — ED INITIAL ASSESSMENT (HPI)
Pt ambulatory to er with adult family states no relief from sores in mouth  Sent home with pills and not able to swallow  Able to use liquid

## 2021-01-23 NOTE — ED NOTES
Round on pt. No distress noted. Mom at bedside. Pt updated on poc and abx administration. Pt denies any needs at this time.

## 2021-01-25 NOTE — TELEPHONE ENCOUNTER
Dr. Du Rowe,  [facility]    Patient was seen again in ED 1/23/21. Photos of patient's mouth in ED provider notes. Patient cancelled appt for today.

## 2021-07-22 ENCOUNTER — TELEPHONE (OUTPATIENT)
Dept: FAMILY MEDICINE CLINIC | Facility: CLINIC | Age: 21
End: 2021-07-22

## 2021-07-22 RX ORDER — VALACYCLOVIR HYDROCHLORIDE 1 G/1
1 TABLET, FILM COATED ORAL 2 TIMES DAILY
Qty: 4 TABLET | Refills: 0 | Status: SHIPPED | OUTPATIENT
Start: 2021-07-22 | End: 2021-07-24

## 2021-07-22 NOTE — TELEPHONE ENCOUNTER
Pt calling regarding his mouth sores he has had for about 3 days. Pt said he sent my chart message yesterday, see encounter. Called to schedule appt, however nothing available until next Friday.  Pt said he's afraid that they will be gone by then and wa

## 2021-07-22 NOTE — TELEPHONE ENCOUNTER
Called pt stating x2-3 days has been experiencing mouth sores. Pt given valacyclovir in past that work for pt. Advised Dr. Erazo out of the office and no availability with any other providers tomorrow.  Advise pt to go to [facility] or  today for further eval and tx

## 2021-07-23 NOTE — TELEPHONE ENCOUNTER
Called pt to inform per PCP indicated prescription sent to [facility] listed, advised if not better to follow up in clinic. No further questions or concerns. Pt verbalized understanding and agreed with POC.

## 2023-12-05 ENCOUNTER — OFFICE VISIT (OUTPATIENT)
Dept: FAMILY MEDICINE CLINIC | Facility: CLINIC | Age: 23
End: 2023-12-05
Payer: COMMERCIAL

## 2023-12-05 VITALS
DIASTOLIC BLOOD PRESSURE: 80 MMHG | WEIGHT: 166 LBS | HEIGHT: 69 IN | SYSTOLIC BLOOD PRESSURE: 120 MMHG | TEMPERATURE: 97 F | HEART RATE: 62 BPM | OXYGEN SATURATION: 98 % | RESPIRATION RATE: 16 BRPM | BODY MASS INDEX: 24.59 KG/M2

## 2023-12-05 DIAGNOSIS — G89.29 CHRONIC PAIN OF RIGHT ANKLE: Primary | ICD-10-CM

## 2023-12-05 DIAGNOSIS — Z23 NEED FOR VACCINATION: ICD-10-CM

## 2023-12-05 DIAGNOSIS — M25.571 CHRONIC PAIN OF RIGHT ANKLE: Primary | ICD-10-CM

## 2023-12-05 PROCEDURE — 3074F SYST BP LT 130 MM HG: CPT | Performed by: FAMILY MEDICINE

## 2023-12-05 PROCEDURE — 90686 IIV4 VACC NO PRSV 0.5 ML IM: CPT | Performed by: FAMILY MEDICINE

## 2023-12-05 PROCEDURE — 90471 IMMUNIZATION ADMIN: CPT | Performed by: FAMILY MEDICINE

## 2023-12-05 PROCEDURE — 3008F BODY MASS INDEX DOCD: CPT | Performed by: FAMILY MEDICINE

## 2023-12-05 PROCEDURE — 3079F DIAST BP 80-89 MM HG: CPT | Performed by: FAMILY MEDICINE

## 2023-12-05 PROCEDURE — 99203 OFFICE O/P NEW LOW 30 MIN: CPT | Performed by: FAMILY MEDICINE

## 2023-12-05 NOTE — PROGRESS NOTES
Went to kick the ball and teammate kicked it at the same time. Immediately after he coudnt feel anything. XR in ER- ST injury and no FX. Boot x 3-4wks. PT after that with mild imrpvoement  When play soccer/workout or run still with pain. Injury was back in March.

## 2024-01-05 ENCOUNTER — HOSPITAL ENCOUNTER (OUTPATIENT)
Dept: MRI IMAGING | Age: 24
Discharge: HOME | End: 2024-01-05
Attending: FAMILY MEDICINE
Payer: COMMERCIAL

## 2024-01-05 DIAGNOSIS — G89.29 CHRONIC PAIN OF RIGHT ANKLE: ICD-10-CM

## 2024-01-05 DIAGNOSIS — M25.571 CHRONIC PAIN OF RIGHT ANKLE: ICD-10-CM

## 2024-01-05 PROCEDURE — 73721 MRI JNT OF LWR EXTRE W/O DYE: CPT | Performed by: FAMILY MEDICINE

## 2024-06-26 ENCOUNTER — TELEPHONE (OUTPATIENT)
Dept: PHYSICAL THERAPY | Facility: HOSPITAL | Age: 24
End: 2024-06-26

## 2024-06-28 ENCOUNTER — TELEPHONE (OUTPATIENT)
Dept: ORTHOPEDICS CLINIC | Facility: CLINIC | Age: 24
End: 2024-06-28

## 2024-06-28 ENCOUNTER — HOSPITAL ENCOUNTER (OUTPATIENT)
Dept: GENERAL RADIOLOGY | Age: 24
Discharge: HOME | End: 2024-06-28
Attending: FAMILY MEDICINE
Payer: COMMERCIAL

## 2024-06-28 ENCOUNTER — OFFICE VISIT (OUTPATIENT)
Dept: PHYSICAL THERAPY | Facility: HOSPITAL | Age: 24
End: 2024-06-28
Attending: FAMILY MEDICINE

## 2024-06-28 DIAGNOSIS — G89.29 CHRONIC PAIN OF RIGHT ANKLE: Primary | ICD-10-CM

## 2024-06-28 DIAGNOSIS — M25.571 RIGHT ANKLE PAIN, UNSPECIFIED CHRONICITY: Primary | ICD-10-CM

## 2024-06-28 DIAGNOSIS — M25.571 CHRONIC PAIN OF RIGHT ANKLE: Primary | ICD-10-CM

## 2024-06-28 DIAGNOSIS — M25.571 RIGHT ANKLE PAIN, UNSPECIFIED CHRONICITY: ICD-10-CM

## 2024-06-28 PROCEDURE — 73610 X-RAY EXAM OF ANKLE: CPT | Performed by: FAMILY MEDICINE

## 2024-06-28 PROCEDURE — 97161 PT EVAL LOW COMPLEX 20 MIN: CPT

## 2024-06-28 PROCEDURE — 97140 MANUAL THERAPY 1/> REGIONS: CPT

## 2024-06-28 NOTE — PROGRESS NOTES
LOWER EXTREMITY EVALUATION:     Diagnosis:   Chronic pain of right ankle (M25.571,G89.29)    Referring Provider: Julita Garland  Date of Evaluation:    6/28/2024    Precautions:  None Next MD visit: none scheduled  Date of Surgery: n/a     PATIENT SUMMARY   Lito Rosen is a 23 year old male who presents to therapy today with complaints of right ankle pain. He states that he was playing soccer in [place] last year and he went to clear the ball and another player went to shoot. They both kicked the ball at the same time, Lito kicked with laces. He did not roll the ankle. He states that right afterwards he couldn't feel his foot and he couldn't walk. He went to the hospital for x-rays which found no fracture, they said it was a soft tissue injury. He states that he was in a boot for awhile. This injury occurred in March 2023 and his ankle was bruised and swollen. He notes that he went to physical therapy in the summer.   Currently, he no longer has numbness but he still feels pain. He states that he can play soccer, but he is in a lot of pain for a few days afterwards. He does have pain at rest and he has noticed that his R leg is weaker than the left. Walking is fine, but if he goes to the gym and does biking and walking he is in pain. He states that he cannot run, the most he has been able to run is 1 mile since the injury. He feels like he would not be able to walk if he runs more than 1 mile.   Sporting plans: he was playing soccer while getting a master's in [place], he is not planning on going back to [place] to play. He did join a league here and he played a full game, he notes that he could barely walk to the car and then had pain for 2 days. He was referred to Ortho but has not seen the doctor yet. He has previously sprained one of his ankles in high school, but is not sure which one.    Description of Symptoms: pain deep in the joint, anterior and medial/lateral talocrural joint  Aggravating Factors:  ascending stairs, running, DF AROM, lunging knee forward, kicking ball, planting R leg to shoot with left, hopping  Relieving Factors: pt has had PT for this, he did some SL balance and SL heel raises. He doesn't take medication. He uses icy hot  Job Requirements: the patient has a desk job as a  downtown. He goes downtown 2x/week, it is a short walk from the train.  Irritability: moderate-high  Imaging:   MRI ankle  1. Geographic area of signal alteration within the posterolateral aspect of the talar dome involving the articular surface, as described above.  Given subtle irregularity of the overlying subchondral bone plate and cartilage this is favored to represent a chronic osteochondral injury.  Less likely considerations include healing talar body fracture and talar osteonecrosis.   2. Mild marrow edema broadly throughout the talar body.  This may be secondary to trauma versus stress injury related to altered weight-bearing.     Pt describes pain level current 3/10, at best 1/10, at worst 7/10.   Current functional limitations include running, walking, ankle mobility, kicking, playing soccer.     Lito describes prior level of function a history of R hamstring injury, history of ankle sprain (pt is not sure which ankle). Pt goals include be able to play soccer, be able to run without pain.  Past medical history was reviewed with Lito. Significant findings include    has a past medical history of Allergic rhinitis due to pollen, Chondromalacia of patella, and Flat foot(734).      ASSESSMENT  Lito presents to physical therapy evaluation with primary c/o persistent R ankle pain after a soccer injury in March 2023. The results of the objective tests and measures show impaired range of motion into dorsiflexion, increased difficulty with SL balance on R, impaired calf strength on R, tenderness to palpation at talus.  Functional deficits include but are not limited to walking, running, balancing, ascending  stairs, hopping.  Signs and symptoms are consistent with diagnosis of R ankle pain which is consistent with osteochondral injury. Encouraged the patient to make the appointment to consult with an orthopedic physician. Pt and PT discussed evaluation findings, pathology, POC and HEP.  Pt voiced understanding and performs HEP correctly without reported pain. Skilled Physical Therapy is medically necessary to address the above impairments and reach functional goals.     OBJECTIVE:   Observation: the R ankle does not appear swollen or bruised at this time.   Vitals: /80  Palpation: there is tenderness at the anterior talocrural joint along the talus, and just inferior to the medial and lateral malleolus      AROM: (* denotes performed with pain)  Knee Foot/Ankle   Flexion: R WNL; L WNL  Extension: R WNL; L WNL    DF knee to wall: R 3 cm*; L 7 cm  PF: R WNL; L WNL  INV: R WNL; L WNL  EV: R WNL; L WNL  Pronation: no pain  Supination: + for familiar ankle pain  Great toe ext: R WNL; L WNL     Accessory motion:   Subtalar: (-) for pain medial and lateral calcaneal glide  Talocrural PA: (-) for hypomobility, no pain  Talocrural AP: hypomobility noted on R, no pain      Strength/MMT: (* denotes performed with pain)  Foot/Ankle   DF: R 5/5; L 5/5  PF: R 13 heel raises; L 21 heel raises  INV: R 5/5; L 5/5  EV: R 5/5; L 5/5  Great toe ext: R 5/5; L 5/5       Gait: pt ambulates on level ground with normal mechanics and R LE is slightly externally rotated t/o gait cycle  Balance: SLS: R >10 sec (toes curling), L >10 sec    Today’s Treatment and Response:   Pt education was provided on exam findings, treatment diagnosis, treatment plan, expectations, and prognosis. Pt was also provided recommendations for activity modifications, possible soreness after evaluation, postural corrections, and importance of remaining active. Performed posterior talar glides grade IV+ 2x4 min  Patient was instructed in and issued a HEP for:   Self  DF mobilization with theraband    Charges: PT Eval Low Complexity, manual therapy x1      Total Timed Treatment: 8 min     Total Treatment Time: 45 min     PLAN OF CARE:    Goals: (to be met in 8 visits)  The patient will report being able to ascend stairs without limitation  The patient will report being able to exercise including walking and biking without limitation  The patient will report being able to jog >1 mile while maintaining a pain level <4/10  The patient will demonstrate a knee to wall DF test that is within 1 cm of contralateral side  The patient will demonstrate a R SL heel raise that is at least 18 repetitions  The patient will report being able to return to playing soccer  The patient will be independent and adherent in a comprehensive HEP    Frequency / Duration: Patient will be seen for 1-2 x/week or a total of 8 visits over a 90 day period. Treatment will include: Gait training, Manual Therapy, Neuromuscular Re-education, Self-Care Home Management, Therapeutic Activities, Therapeutic Exercise, and Home Exercise Program instruction    Education or treatment limitation: None  Rehab Potential:good    LEFS Score  LEFS Score: 73.75 % (6/26/2024 10:30 AM)      Patient/Family/Caregiver was advised of these findings, precautions, and treatment options and has agreed to actively participate in planning and for this course of care.    Thank you for your referral. Please co-sign or sign and return this letter via fax as soon as possible to [phone]. If you have any questions, please contact me at Dept: [phone]    Sincerely,  Electronically signed by therapist: Malorie Lyman PT  Physician's certification required: Yes  I certify the need for these services furnished under this plan of treatment and while under my care.    X___________________________________________________ Date____________________    Certification From: 6/28/2024  To:9/26/2024

## 2024-06-28 NOTE — TELEPHONE ENCOUNTER
Patient is coming in for right ankle - pt has had MRI done on 1.5.24 but no x-rays    Future Appointments   Date Time Provider Department Center   7/18/2024 10:00 AM Hero Lorenzo DO [facility]   7/19/2024  9:30 AM Malorie Lyman, PT [facility]   7/31/2024  3:30 PM Malorie Lyman, PT [facility]   8/2/2024  3:00 PM Malorie Lyman, PT [facility]   8/5/2024  4:15 PM Malorie Lyman, PT [facility]   8/7/2024  2:45 PM Malorie Lyman, PT [facility]   8/12/2024  3:30 PM Malorie Lyman, PT [facility]   8/14/2024  3:30 PM Malorie Lyman, PT [facility]   8/19/2024  3:30 PM Malorie Lyman, PT [facility]

## 2024-07-10 ENCOUNTER — OFFICE VISIT (OUTPATIENT)
Dept: PHYSICAL THERAPY | Facility: HOSPITAL | Age: 24
End: 2024-07-10
Attending: FAMILY MEDICINE
Payer: COMMERCIAL

## 2024-07-10 PROCEDURE — 97140 MANUAL THERAPY 1/> REGIONS: CPT

## 2024-07-10 PROCEDURE — 97112 NEUROMUSCULAR REEDUCATION: CPT

## 2024-07-10 PROCEDURE — 97110 THERAPEUTIC EXERCISES: CPT

## 2024-07-10 NOTE — PROGRESS NOTES
Diagnosis:   Chronic pain of right ankle (M25.571,G89.29)     Referring Provider: Julita Garland  Date of Evaluation:    6/28/2024    Precautions:  None Next MD visit: none scheduled  Date of Surgery: n/a   Insurance Primary/Secondary: [employer] [place] HMO / N/A     # Auth Visits: 8 POC            Subjective: The patient reports that his pain hasn't really changed. He is doing the mobilization, it doesn't change the pain. He was walking around downtown and was pretty sore. After the last session he was walking up the stairs and felt better than usual that day. Pain relief lasted just that day after first session.     Pain: 0/10, pain 5/10 withclosed chain DF       Objective:     Foot/Ankle   DF knee to wall: R 5 cm*; L 7 cm  PF: R WNL; L WNL  INV: R WNL; L WNL  EV: R WNL; L WNL  Pronation: no pain  Supination: + for familiar ankle pain  Great toe ext: R WNL; L WNL         Assessment: The patient has not noted any change in pain since the initial evaluation. He does respond well to posterior talar glides with noted reduced pain with dorsiflexion following the mobilizations. Added strengthening exercises to the patient's HEP in addition to the self mobilization.       Goals:   (to be met in 8 visits)  The patient will report being able to ascend stairs without limitation  The patient will report being able to exercise including walking and biking without limitation  The patient will report being able to jog >1 mile while maintaining a pain level <4/10  The patient will demonstrate a knee to wall DF test that is within 1 cm of contralateral side  The patient will demonstrate a R SL heel raise that is at least 18 repetitions  The patient will report being able to return to playing soccer  The patient will be independent and adherent in a comprehensive HEP    Plan: continue strength progression, follow up after MD visit.  Date: 7/10/2024  TX#: 2/8 Date:                 TX#: 3/ Date:                 TX#: 4/ Date:                  TX#: 5/ Date:   Tx#: 6/   Manual therapy  Post talar glide grade III++  MWM lunge into DF 1/2 kneel       Therapeutic exercise  SL heel raise 3x5  ea over ledge of // bar       Neuro re-ed  BAPS L2 2x10 forward/back and med/lateral  BOSU round side with balance 2x10 on R       X       HEP:   Self DF mobilization with theraband  Access Code: [email or web address]  URL: [email or web address]  Date: 07/10/2024  Prepared by: Malorie    Exercises  - Single Leg Heel Raise  - 1 x daily - 7 x weekly - 3 sets - 5-8 reps  - Single Leg Running Balance  - 1 x daily - 7 x weekly - 1 sets - 10 reps    Charges: manual therapy x1, therapeutic exercise x1, neuro re-ed x1       Total Timed Treatment: 44 min  Total Treatment Time: 44 min

## 2024-07-18 ENCOUNTER — OFFICE VISIT (OUTPATIENT)
Dept: ORTHOPEDICS CLINIC | Facility: CLINIC | Age: 24
End: 2024-07-18
Payer: COMMERCIAL

## 2024-07-18 VITALS — BODY MASS INDEX: 24.59 KG/M2 | WEIGHT: 166 LBS | HEIGHT: 69 IN

## 2024-07-18 DIAGNOSIS — M95.8 OSTEOCHONDRAL DEFECT OF ANKLE: Primary | ICD-10-CM

## 2024-07-18 DIAGNOSIS — M24.173: ICD-10-CM

## 2024-07-18 PROCEDURE — 99204 OFFICE O/P NEW MOD 45 MIN: CPT | Performed by: FAMILY MEDICINE

## 2024-07-18 PROCEDURE — 3008F BODY MASS INDEX DOCD: CPT | Performed by: FAMILY MEDICINE

## 2024-07-18 NOTE — H&P
Sports Medicine Clinic Note     Subjective:    Chief Complaint: Chronic right ankle pain post-soccer injury.    Date of Injury: March 22, 2023    History of Present Illness: This is a 23-year-old male who presents with chronic right ankle pain following a soccer injury in March 2023. The patient describes that the injury occurred when both he and another player kicked the ball simultaneously with full force. He recalls he was unable to walk for a month. Initial diagnosis was a soft tissue injury/sprain and he recalls being placed in a walking boot for about a month. Currently, the patient reports persistent pain, swelling, and loss of strength in the right ankle, especially after running, playing soccer, or long-distance walking (>15,000 steps). He finds relief from avoiding these activities but would like to return to them. He has tried physical therapy, which provided partial relief. He denies using any braces or assistive walking devices. No current pain was reported at the time of this visit.    Objective:    Right Ankle Examination:    Inspection:  - Mild effusion noted.  - No erythema or warmth.    Palpation:  - Tenderness over the lateral and medial aspects of the ankle.  - No palpable masses or bony deformities.    Range of Motion:  - Dorsiflexion: 0-15 degrees  - Plantarflexion: 0-40 degrees  - Pain elicited at end ranges of motion.    Neurovascular:  - Intact sensation to light touch.  - 2+ dorsalis pedis and posterior tibial pulses.    Special Tests:  - Negative Anterior Drawer Test.  - Positive Talar Tilt Test for pain.  - Positive for pain on forced dorsiflexion.    Diagnostic Tests:    X-ray Findings (June 28, 2024):  - Ankle mortise intact.  - No acute fracture or dislocation.  - Normal alignment and bone mineral density.    MRI Findings (January 5, 2024):  - Geographic area of signal alteration within the posterolateral aspect of the talar dome suggesting chronic osteochondral injury.  - Mild marrow  edema throughout the talar body.  - Intact syndesmotic, ATF, PTF, CF, deltoid, and spring ligaments.  - Small tibiotalar and subtalar joint effusions.    Assessment:    Chronic osteochondral injury of the right ankle.    Plan:    Additional imaging/workup:   - No further imaging needed at this time.    Therapy:   - Continue physical therapy focusing on ankle strengthening and stability exercises.    Medications:  - NSAIDs such as ibuprofen for pain and inflammation as needed.    Bracing/Casting:  - Consider the use of an ankle brace during activities to provide additional support.    Procedures:  - Discuss potential surgical interventions with a foot and ankle specialist, including arthroscopy for removal of possibly loose fragments, debridement, or drilling/bone grafting.    Activity Recommendations:  - Avoid running, playing soccer, and long-distance walking for time being.    Follow-Up:  - Follow-up with a foot and ankle specialist for further evaluation and management.      Hero Lorenzo DO, CAQSM   Primary Care Sports Medicine    Department of Orthopaedic Surgery  [place]    [address] [place] [id number]  [address] [place] [id number]    t: [phone]  f: [phone]      [facility]

## 2024-07-19 ENCOUNTER — OFFICE VISIT (OUTPATIENT)
Dept: PHYSICAL THERAPY | Facility: HOSPITAL | Age: 24
End: 2024-07-19
Attending: FAMILY MEDICINE
Payer: COMMERCIAL

## 2024-07-19 PROCEDURE — 97140 MANUAL THERAPY 1/> REGIONS: CPT

## 2024-07-19 PROCEDURE — 97112 NEUROMUSCULAR REEDUCATION: CPT

## 2024-07-19 NOTE — PROGRESS NOTES
Diagnosis:   Chronic pain of right ankle (M25.571,G89.29)     Referring Provider: Julita Garland  Date of Evaluation:    6/28/2024    Precautions:  None Next MD visit: none scheduled  Date of Surgery: n/a   Insurance Primary/Secondary: [place] / N/A     # Auth Visits: 8 POC            Subjective: The patient states that he is good, he saw Dr. Lorenzo yesterday and he explained osteochondral defect. He said that he can continue PT or have surgery. He recommended surgery due to the patient's age and activity level. The patient states that he made an appointment for Aug 13-14 for a consultation with a surgeon. He is seeing Dr. Lewis in [place] for a consultation. He has been feeling a bit more pain lately, but he hasn't been doing much other than the exercises. The exercises are fine, the heel raises are fine, but it can get painful after a bit. He feels like the exercises might be aggravating the pain, it is sore but not sharp. The pain is in the posterior lateral heel. And sometimes it is right away, sometimes not until several reps. The strength is there, but sometimes it hurts.     Pain: 0/10, pain 5/10 with closed chain DF       Objective:     Foot/Ankle   DF knee to wall: R 6 cm*; L 7 cm  PF: R WNL; L WNL  INV: R WNL; L WNL  EV: R WNL; L WNL  Pronation: no pain  Supination: + for familiar ankle pain  Great toe ext: R WNL; L WNL         Assessment: Will have the patient modify HEP due to soreness. With heel raises he did have decreased repetitions on the right lower extremity with strength testing, so will have Lito continue with this exercise but he is to stop once the pain starts. Did issue a theraband to perform a mobilization with movement as he had decreased pain when PA was applied to the calcaneus. Issued a self mobilization for this.       Goals:   (to be met in 8 visits)  The patient will report being able to ascend stairs without limitation  The patient will report being able to exercise  including walking and biking without limitation  The patient will report being able to jog >1 mile while maintaining a pain level <4/10  The patient will demonstrate a knee to wall DF test that is within 1 cm of contralateral side  The patient will demonstrate a R SL heel raise that is at least 18 repetitions  The patient will report being able to return to playing soccer  The patient will be independent and adherent in a comprehensive HEP    Plan: continue strength progression, follow up after MD visit.  Date: 7/10/2024  TX#: 2/8 Date: 7/19/2024             TX#: 3/8 Date:                 TX#: 4/ Date:                 TX#: 5/ Date:   Tx#: 6/   Manual therapy  Post talar glide grade III++  MWM lunge into DF 1/2 kneel Manual therapy  Post talar glides in max DF grade IV++  PF mobs in neutral, then max PF grade IV+  X12 min total      Therapeutic exercise  SL heel raise 3x5  ea over ledge of // bar Therapeutic exercise  SL heel raise MWM post calcaneal glide  <8 min, unbilled      Neuro re-ed  BAPS L2 2x10 forward/back and med/lateral  BOSU round side with balance 2x10 on R Neuro re-ed  SLP on shuttle 62# 2x10 on R on shuttle disc  Step up on BOSU R LE 10x  Reverse lunge slider foot on airex 8x ea      X X      HEP:   Self DF mobilization with theraband  Access Code: [email or web address]  URL: [email or web address]  Date: 07/10/2024  Prepared by: Malorie    Exercises  - Single Leg Heel Raise  - 1 x daily - 7 x weekly - 3 sets - 5-8 reps  - Single Leg Running Balance  - 1 x daily - 7 x weekly - 1 sets - 10 reps    Charges: manual therapy x1, neuro re-ed x2       Total Timed Treatment: 44 min  Total Treatment Time: 44 min

## 2024-07-31 ENCOUNTER — OFFICE VISIT (OUTPATIENT)
Dept: PHYSICAL THERAPY | Facility: HOSPITAL | Age: 24
End: 2024-07-31
Attending: FAMILY MEDICINE
Payer: COMMERCIAL

## 2024-07-31 PROCEDURE — 97140 MANUAL THERAPY 1/> REGIONS: CPT

## 2024-07-31 PROCEDURE — 97112 NEUROMUSCULAR REEDUCATION: CPT

## 2024-07-31 PROCEDURE — 97110 THERAPEUTIC EXERCISES: CPT

## 2024-07-31 NOTE — PROGRESS NOTES
Diagnosis:   Chronic pain of right ankle (M25.571,G89.29)     Referring Provider: Julita Garland  Date of Evaluation:    6/28/2024    Precautions:  None Next MD visit: none scheduled  Date of Surgery: n/a   Insurance Primary/Secondary: [place] / N/A     # Auth Visits: 8 POC            Subjective: The patient reports that after the last session the pain was not as intense and it didn't last as long. The ankle has been feeling good, but he still feels like the left is a lot stronger. Exercises have are going well. After the exercises his ankle is more flexible. He played tennis one day, felt some pain. He has a consultation close to the end of August with the surgeon. With the heel raises at home he felt pain sometimes and stopped, if he did too many. Otherwise it was fine.     Pain: 0/10, pain 4/10 with closed chain DF end range, feels stiff      Objective:     Foot/Ankle   DF knee to wall: R 6 cm*; L 7 cm  PF: R WNL; L WNL  INV: R WNL; L WNL  EV: R WNL; L WNL  Pronation: no pain  Supination: + for familiar ankle pain  Great toe ext: R WNL; L WNL     7/31  SL bridge to fatigue (isometric hold): R: 1:02 (increased L trunk rotation), L: 1:37  SL squat: R 90, L 110      Assessment: Lito has demonstrated improvements in pain, he had less pain with end range of motion today. Focused on ankle mobility and then single limb functional strengthening to improve his strength. Encouraged Lito to perform single limb strengthening at home or at the gym.         Goals:   (to be met in 8 visits)  The patient will report being able to ascend stairs without limitation  The patient will report being able to exercise including walking and biking without limitation  The patient will report being able to jog >1 mile while maintaining a pain level <4/10  The patient will demonstrate a knee to wall DF test that is within 1 cm of contralateral side  The patient will demonstrate a R SL heel raise that is at least 18 repetitions  The  patient will report being able to return to playing soccer  The patient will be independent and adherent in a comprehensive HEP    Plan: continue strength progression, measure knee to wall  Date: 7/10/2024  TX#: 2/8 Date: 7/19/2024             TX#: 3/8 Date: 7/31/2024           TX#: 4/8 Date:                 TX#: 5/ Date:   Tx#: 6/   Manual therapy  Post talar glide grade III++  MWM lunge into DF 1/2 kneel Manual therapy  Post talar glides in max DF grade IV++  PF mobs in neutral, then max PF grade IV+  X12 min total Manual therapy  Talocrural PA mobs in max plantarflexion grade III+     Therapeutic exercise  SL heel raise 3x5  ea over ledge of // bar Therapeutic exercise  SL heel raise MWM post calcaneal glide  <8 min, unbilled Therapeutic exercise  Strength testing  SL squat 15# 2x6 on R, 6x on L     Neuro re-ed  BAPS L2 2x10 forward/back and med/lateral  BOSU round side with balance 2x10 on R Neuro re-ed  SLP on shuttle 62# 2x10 on R on shuttle disc  Step up on BOSU R LE 10x  Reverse lunge slider foot on airex 8x ea Neuro re-ed  Slider 3 direction lunge 5x ea  Partial lunge on round side of BOSU 2x8 on R, 8x on L     X X X     HEP:   Self DF mobilization with theraband  Access Code: [email or web address]  URL: [email or web address]  Date: 07/31/2024  Prepared by: Malorie    Exercises  - Single Leg Heel Raise  - 1 x daily - 7 x weekly - 3 sets - 5-8 reps  - Single Leg Running Balance  - 1 x daily - 7 x weekly - 1 sets - 10 reps  - Single-Leg Quarter Squat   - 1 x daily - 7 x weekly - 3 sets - 5-6 reps  - Alternating Single Leg Bridge  - 1 x daily - 7 x weekly - 3 sets - 5-6 reps    Charges: manual therapy x1, neuro re-ed x1, therapeutic exercise x1       Total Timed Treatment: 44 min  Total Treatment Time: 44 min

## 2024-08-02 ENCOUNTER — OFFICE VISIT (OUTPATIENT)
Dept: PHYSICAL THERAPY | Facility: HOSPITAL | Age: 24
End: 2024-08-02
Attending: FAMILY MEDICINE
Payer: COMMERCIAL

## 2024-08-02 PROCEDURE — 97112 NEUROMUSCULAR REEDUCATION: CPT

## 2024-08-02 PROCEDURE — 97110 THERAPEUTIC EXERCISES: CPT

## 2024-08-02 PROCEDURE — 97140 MANUAL THERAPY 1/> REGIONS: CPT

## 2024-08-02 NOTE — PROGRESS NOTES
Diagnosis:   Chronic pain of right ankle (M25.571,G89.29)     Referring Provider: Julita Garland  Date of Evaluation:    6/28/2024    Precautions:  None Next MD visit: Aug 21st  Date of Surgery: n/a   Insurance Primary/Secondary: [employer] / N/A     # Auth Visits: 8 POC            Subjective: The patient states that his ankle is feeling good. He felt fine after the last session.    Pain: 0/10, pain 4/10 with closed chain DF end range, feels stiff      Objective:     Foot/Ankle   DF knee to wall: R 6 cm*; L 7 cm  PF: R WNL; L WNL  INV: R WNL; L WNL  EV: R WNL; L WNL  Pronation: no pain  Supination: + for familiar ankle pain  Great toe ext: R WNL; L WNL     7/31  SL bridge to fatigue (isometric hold): R: 1:02 (increased L trunk rotation), L: 1:37  SL squat: R 90, L 110    8/2  Knee to wall: 4.5 cm      Assessment: The patient is noting improvements in his ankle pain and his flexibility. He did actually have reduced flexibility with the knee to wall test today, therefore performed mobilizations of the talocrural joint. Continued with single limb strengthening and balance/neuro re-ed to improve proprioception and strength. He had fatigue by the end of the session, however did not have increased ankle pain.         Goals:   (to be met in 8 visits)  The patient will report being able to ascend stairs without limitation  The patient will report being able to exercise including walking and biking without limitation  The patient will report being able to jog >1 mile while maintaining a pain level <4/10  The patient will demonstrate a knee to wall DF test that is within 1 cm of contralateral side  The patient will demonstrate a R SL heel raise that is at least 18 repetitions  The patient will report being able to return to playing soccer  The patient will be independent and adherent in a comprehensive HEP    Plan: SL strength, balance  Date: 7/10/2024  TX#: 2/8 Date: 7/19/2024             TX#: 3/8 Date: 7/31/2024            TX#: 4/8 Date: 8/2/2024                TX#: 5/8 Date:   Tx#: 6/   Manual therapy  Post talar glide grade III++  MWM lunge into DF 1/2 kneel Manual therapy  Post talar glides in max DF grade IV++  PF mobs in neutral, then max PF grade IV+  X12 min total Manual therapy  Talocrural PA mobs in max plantarflexion grade III+ Manual therapy  Talocrural AP mobs in max DF grade IV+  Talocrural PA mobs in max plantarflexion grade III+    Therapeutic exercise  SL heel raise 3x5  ea over ledge of // bar Therapeutic exercise  SL heel raise MWM post calcaneal glide  <8 min, unbilled Therapeutic exercise  Strength testing  SL squat 15# 2x6 on R, 6x on L Therapeutic exercise  SL squat 2x6 ea  Bridge with HS curl on TRX 3x5-6      Neuro re-ed  BAPS L2 2x10 forward/back and med/lateral  BOSU round side with balance 2x10 on R Neuro re-ed  SLP on shuttle 62# 2x10 on R on shuttle disc  Step up on BOSU R LE 10x  Reverse lunge slider foot on airex 8x ea Neuro re-ed  Slider 3 direction lunge 5x ea  Partial lunge on round side of BOSU 2x8 on R, 8x on L Neuro re-ed  Lateral slider lunge 2x8 ea  BAPS board L2, 20x A-P CIrcles 5x ea CW/CCW  Standing hip abduction SLS on flat side of BOSU 2x10 ea    X X X X    HEP:   Self DF mobilization with theraband  Access Code: [email or web address]  URL: [email or web address]  Date: 07/31/2024  Prepared by: Malorie    Exercises  - Single Leg Heel Raise  - 1 x daily - 7 x weekly - 3 sets - 5-8 reps  - Single Leg Running Balance  - 1 x daily - 7 x weekly - 1 sets - 10 reps  - Single-Leg Quarter Squat   - 1 x daily - 7 x weekly - 3 sets - 5-6 reps  - Alternating Single Leg Bridge  - 1 x daily - 7 x weekly - 3 sets - 5-6 reps    Charges: manual therapy x1, neuro re-ed x1, therapeutic exercise x1       Total Timed Treatment: 44 min  Total Treatment Time: 44 min

## 2024-08-05 ENCOUNTER — OFFICE VISIT (OUTPATIENT)
Dept: PHYSICAL THERAPY | Facility: HOSPITAL | Age: 24
End: 2024-08-05
Attending: FAMILY MEDICINE
Payer: COMMERCIAL

## 2024-08-05 PROCEDURE — 97110 THERAPEUTIC EXERCISES: CPT

## 2024-08-05 PROCEDURE — 97112 NEUROMUSCULAR REEDUCATION: CPT

## 2024-08-05 PROCEDURE — 97140 MANUAL THERAPY 1/> REGIONS: CPT

## 2024-08-05 NOTE — PROGRESS NOTES
Diagnosis:   Chronic pain of right ankle (M25.571,G89.29)     Referring Provider: Julita Garland  Date of Evaluation:    6/28/2024    Precautions:  None Next MD visit: Aug 21st  Date of Surgery: n/a   Insurance Primary/Secondary: [place] / N/A     # Auth Visits: 8 POC            Subjective: The patient reports that his ankle is about the same. He was sore in the legs on Saturday. Generally day to day he has felt normal. The ankle was not sore after the last session.     Pain: 0/10, pain 4/10 with knee to wall test.      Objective:     Foot/Ankle   DF knee to wall: R 6 cm*; L 7 cm  PF: R WNL; L WNL  INV: R WNL; L WNL  EV: R WNL; L WNL  Pronation: no pain  Supination: + for familiar ankle pain  Great toe ext: R WNL; L WNL     7/31  SL bridge to fatigue (isometric hold): R: 1:02 (increased L trunk rotation), L: 1:37  SL squat: R 90, L 110    8/2  Knee to wall: 4.5 cm    8/5  Knee to wall: 5 cm, 6.5 after talar glides on medial aspect  Talar AP glide is hypomobile, moreso when mobilized on the medial aspect of the talus.       Assessment: The patient continues to have ankle pain in the anterior/medial and posterior medial aspect of the ankle with end range dorsiflexion ROM. Modified the mobilizations go focus on the medial aspect of talus during AP mobs, which had greater hypomobility compared to the L side, and his knee to wall test did improve with this.         Goals:   (to be met in 8 visits)  The patient will report being able to ascend stairs without limitation  The patient will report being able to exercise including walking and biking without limitation  The patient will report being able to jog >1 mile while maintaining a pain level <4/10  The patient will demonstrate a knee to wall DF test that is within 1 cm of contralateral side  The patient will demonstrate a R SL heel raise that is at least 18 repetitions  The patient will report being able to return to playing soccer  The patient will be independent  and adherent in a comprehensive HEP    Plan: MAGUE test  Date: 7/10/2024  TX#: 2/8 Date: 7/19/2024             TX#: 3/8 Date: 7/31/2024           TX#: 4/8 Date: 8/2/2024                TX#: 5/8 Date: 8/5/2024  Tx#: 6/8   Manual therapy  Post talar glide grade III++  MWM lunge into DF 1/2 kneel Manual therapy  Post talar glides in max DF grade IV++  PF mobs in neutral, then max PF grade IV+  X12 min total Manual therapy  Talocrural PA mobs in max plantarflexion grade III+ Manual therapy  Talocrural AP mobs in max DF grade IV+  Talocrural PA mobs in max plantarflexion grade III+ Manual therapy  MWM DF 1/2 kneel 2x10  Post talar glides (on ant-med portion) in DF position grade IV++   Therapeutic exercise  SL heel raise 3x5  ea over ledge of // bar Therapeutic exercise  SL heel raise MWM post calcaneal glide  <8 min, unbilled Therapeutic exercise  Strength testing  SL squat 15# 2x6 on R, 6x on L Therapeutic exercise  SL squat 2x6 ea  Bridge with HS curl on TRX 3x5-6   Therapeutic exercise  Gasotroc stretching 3x30 sec  Lateral step down small plyobox 2x8 ea   Neuro re-ed  BAPS L2 2x10 forward/back and med/lateral  BOSU round side with balance 2x10 on R Neuro re-ed  SLP on shuttle 62# 2x10 on R on shuttle disc  Step up on BOSU R LE 10x  Reverse lunge slider foot on airex 8x ea Neuro re-ed  Slider 3 direction lunge 5x ea  Partial lunge on round side of BOSU 2x8 on R, 8x on L Neuro re-ed  Lateral slider lunge 2x8 ea  BAPS board L2, 20x A-P CIrcles 5x ea CW/CCW  Standing hip abduction SLS on flat side of BOSU 2x10 ea Neuro re-ed  SL RDL 2x6 ea on airex  Step up high knee on round side of BOSU 10x ea   X X X X X   HEP:   Self DF mobilization with theraband  Access Code: [email or web address]  URL: [email or web address]  Date: 07/31/2024  Prepared by: Malorie Banks  - Single Leg Heel Raise  - 1 x daily - 7 x weekly - 3 sets - 5-8 reps  - Single Leg Running Balance  - 1 x daily - 7 x weekly - 1 sets - 10 reps  - Single-Leg  Quarter Squat   - 1 x daily - 7 x weekly - 3 sets - 5-6 reps  - Alternating Single Leg Bridge  - 1 x daily - 7 x weekly - 3 sets - 5-6 reps    Charges: manual therapy x1, neuro re-ed x1, therapeutic exercise x1       Total Timed Treatment: 44 min  Total Treatment Time: 44 min

## 2024-08-07 ENCOUNTER — APPOINTMENT (OUTPATIENT)
Dept: PHYSICAL THERAPY | Facility: HOSPITAL | Age: 24
End: 2024-08-07
Attending: FAMILY MEDICINE
Payer: COMMERCIAL

## 2024-08-12 ENCOUNTER — APPOINTMENT (OUTPATIENT)
Dept: PHYSICAL THERAPY | Facility: HOSPITAL | Age: 24
End: 2024-08-12
Attending: FAMILY MEDICINE
Payer: COMMERCIAL

## 2024-08-14 ENCOUNTER — OFFICE VISIT (OUTPATIENT)
Dept: PHYSICAL THERAPY | Facility: HOSPITAL | Age: 24
End: 2024-08-14
Attending: FAMILY MEDICINE
Payer: COMMERCIAL

## 2024-08-14 PROCEDURE — 97112 NEUROMUSCULAR REEDUCATION: CPT

## 2024-08-14 PROCEDURE — 97110 THERAPEUTIC EXERCISES: CPT

## 2024-08-14 PROCEDURE — 97140 MANUAL THERAPY 1/> REGIONS: CPT

## 2024-08-14 NOTE — PROGRESS NOTES
Diagnosis:   Chronic pain of right ankle (M25.571,G89.29)     Referring Provider: Julita Garland  Date of Evaluation:    6/28/2024    Precautions:  None Next MD visit: Aug 21st  Date of Surgery: n/a   Insurance Primary/Secondary: [place] / N/A     # Auth Visits: 8 POC            Subjective: The patient states that things are the same. Mainly walking down stairs is when he has the pain, and it is sometimes at rest. He has not done any higher level activity like running or playing soccer. He sees the doctor on Aug 21. He feels stronger, but the pain is the same and ankle is more flexible.      Pain: 0/10, pain 2-3/10 with knee to wall test.      Objective:     Foot/Ankle   DF knee to wall: R 6 cm*; L 7 cm  PF: R WNL; L WNL  INV: R WNL; L WNL  EV: R WNL; L WNL  Pronation: no pain  Supination: + for familiar ankle pain  Great toe ext: R WNL; L WNL     7/31  SL bridge to fatigue (isometric hold): R: 1:02 (increased L trunk rotation), L: 1:37  SL squat: R 90, L 110    8/2  Knee to wall: 4.5 cm    8/5  Knee to wall: 5 cm, 6.5 after talar glides on medial aspect  Talar AP glide is hypomobile, moreso when mobilized on the medial aspect of the talus.       8/14: knee to wall test 6 cm, 2-3/10      Assessment: Lito demonstrated improved dorsiflexion range of motion today with decreased pain compared to the previous session. Overall with functional activities he is not noting any improvements in pain. Lito is noting improvements in strength and flexibility. Will plan to have Lito continue with exercises and follow up after his MD visit.         Goals:   (to be met in 8 visits)  The patient will report being able to ascend stairs without limitation  The patient will report being able to exercise including walking and biking without limitation  The patient will report being able to jog >1 mile while maintaining a pain level <4/10  The patient will demonstrate a knee to wall DF test that is within 1 cm of contralateral  side  The patient will demonstrate a R SL heel raise that is at least 18 repetitions  The patient will report being able to return to playing soccer  The patient will be independent and adherent in a comprehensive HEP    Plan: MAGUE test  Date: 7/19/2024             TX#: 3/8 Date: 7/31/2024           TX#: 4/8 Date: 8/2/2024                TX#: 5/8 Date: 8/5/2024  Tx#: 6/8 Date: 8/14/2024  Tx#: 7/8   Manual therapy  Post talar glides in max DF grade IV++  PF mobs in neutral, then max PF grade IV+  X12 min total Manual therapy  Talocrural PA mobs in max plantarflexion grade III+ Manual therapy  Talocrural AP mobs in max DF grade IV+  Talocrural PA mobs in max plantarflexion grade III+ Manual therapy  MWM DF 1/2 kneel 2x10  Post talar glides (on ant-med portion) in DF position grade IV++ Manual therapy  Post talar glides (on ant-med portion) in DF position grade IV++  MWM medial calcaneal glides on R   Therapeutic exercise  SL heel raise MWM post calcaneal glide  <8 min, unbilled Therapeutic exercise  Strength testing  SL squat 15# 2x6 on R, 6x on L Therapeutic exercise  SL squat 2x6 ea  Bridge with HS curl on TRX 3x5-6   Therapeutic exercise  Gasotroc stretching 3x30 sec  Lateral step down small plyobox 2x8 ea Therapeutic exercise  Toe yoga 10x ea  Toe spreading 10x ea  Arch raises 10x ea     Neuro re-ed  SLP on shuttle 62# 2x10 on R on shuttle disc  Step up on BOSU R LE 10x  Reverse lunge slider foot on airex 8x ea Neuro re-ed  Slider 3 direction lunge 5x ea  Partial lunge on round side of BOSU 2x8 on R, 8x on L Neuro re-ed  Lateral slider lunge 2x8 ea  BAPS board L2, 20x A-P CIrcles 5x ea CW/CCW  Standing hip abduction SLS on flat side of BOSU 2x10 ea Neuro re-ed  SL RDL 2x6 ea on airex  Step up high knee on round side of BOSU 10x ea Neuro re-ed  Step up with high knee and reverse lunge flat side of BOSU 2x6 ea   X X X X X   HEP:   Self DF mobilization with theraband  Access Code: [email or web address]  URL:  [email or web address]  Date: 07/31/2024  Prepared by: Malorie    Exercises  - Single Leg Heel Raise  - 1 x daily - 7 x weekly - 3 sets - 5-8 reps  - Single Leg Running Balance  - 1 x daily - 7 x weekly - 1 sets - 10 reps  - Single-Leg Quarter Squat   - 1 x daily - 7 x weekly - 3 sets - 5-6 reps  - Alternating Single Leg Bridge  - 1 x daily - 7 x weekly - 3 sets - 5-6 reps    Charges: manual therapy x1, neuro re-ed x1, therapeutic exercise x1       Total Timed Treatment: 42 min  Total Treatment Time: 42 min

## 2024-08-19 ENCOUNTER — APPOINTMENT (OUTPATIENT)
Dept: PHYSICAL THERAPY | Facility: HOSPITAL | Age: 24
End: 2024-08-19
Attending: FAMILY MEDICINE
Payer: COMMERCIAL

## 2024-08-21 ENCOUNTER — OFFICE VISIT (OUTPATIENT)
Facility: LOCATION | Age: 24
End: 2024-08-21
Payer: COMMERCIAL

## 2024-08-21 DIAGNOSIS — M25.471 RIGHT ANKLE EFFUSION: ICD-10-CM

## 2024-08-21 DIAGNOSIS — M95.8 OSTEOCHONDRAL DEFECT OF TALUS: Primary | ICD-10-CM

## 2024-08-21 DIAGNOSIS — M25.571 RIGHT ANKLE PAIN, UNSPECIFIED CHRONICITY: ICD-10-CM

## 2024-08-21 PROCEDURE — 99203 OFFICE O/P NEW LOW 30 MIN: CPT | Performed by: PODIATRIST

## 2024-08-29 NOTE — PATIENT INSTRUCTIONS
Clinic hours for Dr. Mendez:  Monday 8:20am - 4:30pm  Tuesday 8:20am - 4:30pm  Wednesday 8:20am - 4:30pm  Thursday 8:20am - 4:30pm  Friday           Not in    If you need a refill on your prescription, please call your pharmacy and let them know. Please be proactive and call before your medication runs out. The pharmacy will then contact us for the refill. Please allow 24-48 hours for the refill to be processed.     If your Physician/Specialty Provider has ordered additional laboratory or radiology testing to be done before your next scheduled office visit, those results will be discussed with you at that upcoming visit. This will allow you the opportunity to go over the results in person with your provider. If your results require immediate intervention, you will be contacted sooner by phone call.     If your Physician/Specialty Provider has ordered labs for you to be done either today during this office visit, or in between office visits, you may receive any non-urgent test results and recommendations via your [employer]. To retrieve these results and recommendations, please click on your lab result itself to see if any comments have been left for you from your provider. If you do not have a [employer], your provider's office will either call you with those results or you may reach out to the office yourself to obtain results.    You may be receiving a patient satisfaction survey in the mail or in your email. If you receive an email survey, please look for the subject line of: \" Your provider name\" would like your feedback\". Please take the time to complete your survey either via the mail or email, as your feedback is very important to us. We strive to make your experience exceptional and your comments help us with that goal. We look forward to hearing from you.    Recommend that you continue with your current medications.  24-hour urine testing is  Well-Child Checkup: 15 to 25 Years     Stay involved in your teen’s life. Make sure your teen knows you’re always there when he or she needs to talk. During the teen years, it’s important to keep having yearly checkups.  Your teen may be embarrassed a Body changes. The body grows and matures during puberty. Hair will grow in the pubic area and on other parts of the body. Girls grow breasts and menstruate (have monthly periods). A boy’s voice changes, becoming lower and deeper.  As the penis matures, er · Eat healthy. Your child should eat fruits, vegetables, lean meats, and whole grains every day. Less healthy foods—like [employer] fries, candy, and chips—should be eaten rarely.  Some teens fall into the trap of snacking on junk food and fast food throughout · Help your teen wake up, if needed. Go into the bedroom, open the blinds, and get your teen out of bed — even on weekends or during school vacations. · Being active during the day will help your child sleep better at night.   · Discourage use of the TV, c · Teach your child to make good decisions about drugs, alcohol, sex, and other risky behaviors.  Work together to come up with strategies for staying safe and dealing with peer pressure. Make sure your teenager knows he or she can always come to you for hel

## 2024-09-11 ENCOUNTER — OFFICE VISIT (OUTPATIENT)
Dept: PHYSICAL THERAPY | Facility: HOSPITAL | Age: 24
End: 2024-09-11
Attending: FAMILY MEDICINE
Payer: COMMERCIAL

## 2024-09-11 PROCEDURE — 97110 THERAPEUTIC EXERCISES: CPT

## 2024-09-11 PROCEDURE — 97112 NEUROMUSCULAR REEDUCATION: CPT

## 2024-09-11 NOTE — PROGRESS NOTES
Discharge Summary  Pt has attended 8 visits in Physical Therapy.       Diagnosis:   Chronic pain of right ankle (M25.571,G89.29)     Referring Provider: Julita Garland  Date of Evaluation:    2024    Precautions:  None Next MD visit: Aug 21st  Date of Surgery: n/a   Insurance Primary/Secondary: [employer] [place] HMO / N/A     # Auth Visits: 8 POC            Subjective: The patient reports that things are good. He saw the surgeon who ordered another MRI. He scheduled it for next Wednesday. Then after that he will figure out when the surgery is. He has had more pain recently. He hasn't been doing much activity other than the exercises. He is having pain during the exercises, he always did but it was nothing terrible. Now he is just having pain at rest. The surgeon recommended surgery unless if he wants to continue how he is currently. He said that he would fill something in with. After the exercises he feels more flexible, but that doesn't change the walking around. Will make today the last session since he will get the MRI and then see the surgeon again.     Pain: 0/10, pain 2-3/10 with knee to wall test.      Objective:         8  Knee to wall: 5 cm, 6.5 after talar glides on medial aspect  Talar AP glide is hypomobile, moreso when mobilized on the medial aspect of the talus.       : knee to wall test 6 cm, 2-3/10      Knee to wall:   R: 7 cm, L 9.5 cm      MAGUE test: (tandem R in back)  Double leg firm surface: 0  Single leg firm surface: R 7; L 11   Tandem: 4  On Airex  Double le  Single leg: R 11; L 13  Tandem: 6      Assessment: Will have Lito hold on the single leg heel raises as he is having pain with this. Assessed the MAGUE test to further evaluate the static balance on firm and compliant surface. The patient had increased difficulty balancing on the airex pad, especially in the narrow base of support. Will plan to discharge at this time as the patient pursues surgical options for his ankle pain.          Goals:   (to be met in 8 visits)  The patient will report being able to ascend stairs without limitation MET, pain with going down stairs  The patient will report being able to exercise including walking and biking without limitation NOT MET  The patient will report being able to jog >1 mile while maintaining a pain level <4/10 NOT MET  The patient will demonstrate a knee to wall DF test that is within 1 cm of contralateral side   The patient will demonstrate a R SL heel raise that is at least 18 repetitions NOT TESTED  The patient will report being able to return to playing soccer  NOT MET  The patient will be independent and adherent in a comprehensive HEP          Plan: the patient will be discharged from this plan of care for physical therapy at this time.     Patient/Family/Caregiver was advised of these findings, precautions, and treatment options and has agreed to actively participate in planning and for this course of care.    Thank you for your referral. If you have any questions, please contact me at Dept: [phone].    Sincerely,  Electronically signed by therapist: Malorie Lyman, PT     Certification From: 9/11/2024  To:12/10/2024     Date: 8/2/2024                TX#: 5/8 Date: 8/5/2024  Tx#: 6/8 Date: 8/14/2024  Tx#: 7/8 Date: 9/11/2024  Tx#: 8/8   Manual therapy  Talocrural AP mobs in max DF grade IV+  Talocrural PA mobs in max plantarflexion grade III+ Manual therapy  MWM DF 1/2 kneel 2x10  Post talar glides (on ant-med portion) in DF position grade IV++ Manual therapy  Post talar glides (on ant-med portion) in DF position grade IV++  MWM medial calcaneal glides on R X   Therapeutic exercise  SL squat 2x6 ea  Bridge with HS curl on TRX 3x5-6   Therapeutic exercise  Gasotroc stretching 3x30 sec  Lateral step down small plyobox 2x8 ea Therapeutic exercise  Toe yoga 10x ea  Toe spreading 10x ea  Arch raises 10x ea   Therapeutic exercise  Reassessment of objective findings  Discussion of current  HEP, updates  Continental leg lunge 2x6 ea  Standing clamshell green TB around knees 2x8 ea  X30 min   Neuro re-ed  Lateral slider lunge 2x8 ea  BAPS board L2, 20x A-P CIrcles 5x ea CW/CCW  Standing hip abduction SLS on flat side of BOSU 2x10 ea Neuro re-ed  SL RDL 2x6 ea on airex  Step up high knee on round side of BOSU 10x ea Neuro re-ed  Step up with high knee and reverse lunge flat side of BOSU 2x6 ea Neuro re-ed  MAGUE test  SL balance eyes closed  X10 min   X X X    HEP:   Self DF mobilization with theraband  Access Code: [email or web address]  URL: [email or web address]  Date: 09/11/2024  Prepared by: Malorie    Exercises  - Single Leg Running Balance  - 1 x daily - 7 x weekly - 1 sets - 10 reps  - Single-Leg Quarter Squat   - 1 x daily - 7 x weekly - 3 sets - 5-6 reps  - Alternating Single Leg Bridge  - 1 x daily - 7 x weekly - 3 sets - 5-6 reps  - Trail Leg Lunge  - 1 x daily - 7 x weekly - 3 sets - 6-8 reps  - Standing Clam with Resistance Loop  - 1 x daily - 7 x weekly - 3 sets - 6-8 reps  - Single Leg Deadlift with Kettlebell  - 1 x daily - 7 x weekly - 3 sets - 8 reps  - Single Leg Balance with Eyes Closed  - 1 x daily - 7 x weekly - 1 sets - 3 reps  - Toe Yoga - Alternating Great Toe and Lesser Toe Extension  - 1 x daily - 7 x weekly - 1 sets - 10 reps  - Arch Lifting  - 1 x daily - 7 x weekly - 1 sets - 10 reps    Charges:  neuro re-ed x1, therapeutic exercise x2       Total Timed Treatment: 40 min  Total Treatment Time: 40 min

## 2024-09-18 ENCOUNTER — HOSPITAL ENCOUNTER (OUTPATIENT)
Dept: MRI IMAGING | Age: 24
Discharge: HOME | End: 2024-09-18
Attending: PODIATRIST
Payer: COMMERCIAL

## 2024-09-18 DIAGNOSIS — M25.571 RIGHT ANKLE PAIN, UNSPECIFIED CHRONICITY: ICD-10-CM

## 2024-09-18 DIAGNOSIS — M25.471 RIGHT ANKLE EFFUSION: ICD-10-CM

## 2024-09-18 DIAGNOSIS — M95.8 OSTEOCHONDRAL DEFECT OF TALUS: ICD-10-CM

## 2024-09-18 PROCEDURE — 73721 MRI JNT OF LWR EXTRE W/O DYE: CPT | Performed by: PODIATRIST

## 2024-09-30 ENCOUNTER — TELEPHONE (OUTPATIENT)
Facility: LOCATION | Age: 24
End: 2024-09-30

## 2024-10-11 ENCOUNTER — OFFICE VISIT (OUTPATIENT)
Facility: LOCATION | Age: 24
End: 2024-10-11
Payer: COMMERCIAL

## 2024-10-11 DIAGNOSIS — M25.471 RIGHT ANKLE EFFUSION: ICD-10-CM

## 2024-10-11 DIAGNOSIS — M95.8 OSTEOCHONDRAL DEFECT OF TALUS: Primary | ICD-10-CM

## 2024-10-11 DIAGNOSIS — M25.571 RIGHT ANKLE PAIN, UNSPECIFIED CHRONICITY: ICD-10-CM

## 2024-10-11 PROCEDURE — 99214 OFFICE O/P EST MOD 30 MIN: CPT | Performed by: PODIATRIST

## 2024-10-14 ENCOUNTER — TELEPHONE (OUTPATIENT)
Facility: LOCATION | Age: 24
End: 2024-10-14

## 2024-10-14 NOTE — TELEPHONE ENCOUNTER
Procedure: Right ankle arthroscopy with extensive debridement, osteochondral repair of talus as needed, decompression of talus as needed.   CPT code: 08230?  Length of Surgery: 2.5 hours  Any Instruments/Hardware: ankle arthroscopy equipment, jeronimo OCD repair materials  Call patient: ASAP  Anesthesia: Gen  [place] [facility] [place] [facility]  Assistance: Danny Guzman if [facility]  Pacemaker: No  Anticoagulants: No  Nickel Allergy: No  Latex Allergy: No  Diagnosis/ICD Code:   Right ankle effusion  Osteochondral defect of talus, right  Right ankle pain

## 2024-10-14 NOTE — PROGRESS NOTES
[facility]  Progress Note    Lito Rosen is a 24 year old male.   Chief Complaint   Patient presents with    Ankle Pain     R ankle f/u- here for MRI result and to discuss sx         HPI:     Patient is a pleasant 24-year-old male who is returning to clinic today for recheck on right ankle and to discuss MRI results.  Patient does have a known OCD lesion of the right ankle from an injury he sustained in March 2023 while playing soccer.  Patient does continue to have pain within the ankle joint of the right lower extremity.  Does state that he has increasing pain with increased activity, as well as some ADLs such as stairs.  He is hoping to someday get back to being active with sports.  Unable to do so now.  Patient has tried physical therapy with minimal relief, as well as activity modification and protected weightbearing in a cam boot.  Denying recent injuries, numbness, or tingling.  No other concerns.  Denies recent nausea, vomiting, fevers, chills.      Allergies: Patient has no known allergies.   No current outpatient medications on file.      Past Medical History:    Allergic rhinitis due to pollen    Chondromalacia of patella    Flat foot(734)      Past Surgical History:   Procedure Laterality Date    Other surgical history      STRICTURE IN INTESTINES AT 1YRS OLD      Family History   Problem Relation Age of Onset    Hypertension Mother     Hypertension Maternal Grandfather     Cancer Neg     Heart Disease Neg     Stroke Neg       Social History     Socioeconomic History    Marital status: Single   Tobacco Use    Smoking status: Never    Smokeless tobacco: Never   Vaping Use    Vaping status: Never Used   Substance and Sexual Activity    Alcohol use: No    Drug use: No           REVIEW OF SYSTEMS:     10 point ROS completed and was negative, except for pertinent positive and negatives stated in subjective.       EXAM:     Right lower extremity focused exam:  GENERAL: well developed, well  nourished, in no apparent distress  EXTREMITIES:  1. Integument: Skin appears moist, warm, and supple with positive hair growth. There are no color changes. No open lesions. No macerations. No Hyperkeratotic lesions.   2. Vascular: Dorsalis pedis 2/4 bilateral and posterior tibial pulses 2/4 bilateral, capillary refill normal.  3. Neurological: Gross sensation intact via light touch bilaterally.  Normal sharp/dull sensation  4. Musculoskeletal: All muscle groups are graded 5/5 in the foot and ankle.  Patient does have tenderness with palpation to the lateral, anterior, and medial ankle gutters anteriorly, as well as pain with palpation deep to posterior lateral aspect of the ankle.  Patient does have pain at end ROM dorsiflexion and plantarflexion of the ankle.  Patient does have laxity with anterior drawer test bilaterally.  No pain elicited on anterior drawer exam.  No pain with palpation to lateral ankle ligaments.     MRI ANKLE, RIGHT (JKV=[id number])    Result Date: 9/18/2024  CONCLUSION:  Geographic area of abnormal signal intensity in the posterolateral aspect of the talar dome is unchanged from the January 2024 MRI and probably relates to an osteochondral lesion.  Associated mild marrow edema has partially improved.  No evidence of an associated unstable osseous fragment or overlying articular cartilage defect.    Dictated by (CST): Elias Perez MD on 9/18/2024 at 2:36 PM     Finalized by (CST): Elias Perez MD on 9/18/2024 at 2:46 PM             ASSESSMENT AND PLAN:   Diagnoses and all orders for this visit:    Osteochondral defect of talus    Right ankle effusion    Right ankle pain, unspecified chronicity        Plan:   -Patient was seen and evaluated today in clinic.  Chart history reviewed.    -Discussed MRI results with patient, showing an abnormal signal intensity to the posterior lateral aspect of the talar dome, which is unchanged from January 2024.  The associated marrow edema has partially  improved.  See above for impression.    -Clinically, patient continues to have pain in the area of concern and OCD lesion of the right talus    -Patient has tried conservative treatment options and continues to have pain.  Discussed further treatment recommendations.    -Recommending surgical intervention at this time.  Procedure recommended: Right ankle arthroscopy with extensive debridement, osteochondral repair of talus as needed, decompression of talus as needed.  Discussed the procedure in detail with patient today and all perioperative protocols.  All of patient's questions and concerns were addressed.  We will plan for surgical intervention on a mutually agreeable date in the future.    -Did explain the patient that he is at risk of ongoing ankle joint issues such as arthritis due to his injury.  He verbalizes understanding.    The patient has been advised of the approximate disability involved for these procedures. In addition, the patient has been advised as to the alternatives of care, including continued conservative care as well as surgical procedures. The patient has failed all conservative treatment at this point. The patient understands that if surgical procedures are performed, there are risks and complications that could occur, including but not limited to: hematoma formation, damage to the nervous system, seroma formation, development of a DVT or phlebitis, infection, painful scar tissue formation, stiffness, limited motion, impaired healing, increased chance of swelling, reaction to implanted biomaterials, continued pain, loss of limb, loss of life, and the possibility that future surgery may need to be performed. The patient was given the opportunity to ask questions which were answered. The patient voiced no concerns and will consider all these options. Patient desires surgical intervention. No guarantees were given or implied. Pt consented freely to surgical intervention.  I spent approximately  30 minutes discussing the surgical procedures at length. I reviewed in detail the procedure to be performed, postoperative course, disability to be expected, healing time, prognosis and the importance of their following the recommended postoperative care. Possible complications discussed included but not limited to postoperative pain, swelling, stiffness, rejection of the implant if utilized, return to surgery, infection, residual pain, possible blood clot formation, bleeding, etc. Possible complications relating to over activity and limitations during the postoperative period were reviewed. The patient has responsibilities to help insure successful outcome of the surgery. Postoperative oral and written instructions were reviewed and postoperative course of treatment discussed in detail. Management of postoperative pain was discussed. All questions related to preoperative, operative and postoperative course of treatment were answered to their understanding and satisfaction. RTO and follow up after surgery is necessary and expected and agreed to by the patient. The patient acknowledged understanding of the above and agrees to follow all instructions and protocols. No guarantee or warranty was given or implied as to the results of the surgery.       -The patient indicates understanding of these issues and agrees to the plan.    Time spent reviewing pertinent information from patient's chart, reviewing any pertinent imaging, obtaining history and physical exam, discussing and mutually agreeing on a treatment plan, and documenting encounter: 35 minutes    RTC for 1 week postop follow-up      Calixto Lewis DPM        [facility]  [address] [place] [id number]     10/14/2024    Dragon speech recognition software was used to prepare this note.  Errors in word recognition may occur.  Please contact me with any questions/concerns with this note.

## 2024-10-17 DIAGNOSIS — M25.571 RIGHT ANKLE PAIN, UNSPECIFIED CHRONICITY: Primary | ICD-10-CM

## 2024-10-17 DIAGNOSIS — M95.8 OSTEOCHONDRAL DEFECT OF TALUS: ICD-10-CM

## 2024-10-17 DIAGNOSIS — M25.471 RIGHT ANKLE EFFUSION: ICD-10-CM

## 2024-10-17 NOTE — TELEPHONE ENCOUNTER
S/W patient and patient stated that 10/31 would be a good surgery day for him. Informed patient I would be in contact with [facility] to check for availability. At this time, an email with case information has been sent to schedulers at [facility] ([employer]). Patient is aware I will be in contact as soon as I have a confirmed surgical date.

## 2024-10-21 ENCOUNTER — TELEPHONE (OUTPATIENT)
Dept: PODIATRY CLINIC | Facility: CLINIC | Age: 24
End: 2024-10-21

## 2024-10-21 NOTE — TELEPHONE ENCOUNTER
Dr. Sexton,     The patient is scheduled for outpatient foot surgery with Dr. Lewis on 10/31 for the following procedure(s) Right ankle arthroscopy with extensive debridement, osteochondral repair of talus as needed, decompression of talus as needed.      The patient has been advised to contact your office for pre-operative clearance.  The patient needs the following test/exams    _X___ History and Physical (H&P) may be no older than THIRTY (30) days prior to surgery.     ____ EKG for patients that are age 50 or older, have hypertension, diabetes or a history of cardiac disease or are obese.  This should be no older than 6 months prior to surgery.    __X__ Complete Metabolic Panel (CMP) & CBC. This should be no older than 3 months prior to surgery.    Please include any other test you deem necessary for medical clearance.    Thank you,   Barbara CARDOZA   Surgical Scheduler   [phone]

## 2024-10-21 NOTE — TELEPHONE ENCOUNTER
Patient cannot do tomorrow due to work. Asked if he could do Wednesday or Friday and he said he is off. Please advise if we can double book either day?

## 2024-10-21 NOTE — TELEPHONE ENCOUNTER
Please schedule preop, ok to double book for 2:40 tomorrow or ask patient what time works for him and I can accommodate him.

## 2024-10-21 NOTE — TELEPHONE ENCOUNTER
Scheduled    Future Appointments   Date Time Provider Department Center   10/23/2024  8:40 AM Consuelo Sexton MD [facility]   11/8/2024  2:30 PM Jonny Lewis DPM [facility]   11/15/2024 11:30 AM Jonny Lewis DPM [facility]

## 2024-10-22 ENCOUNTER — TELEPHONE (OUTPATIENT)
Facility: LOCATION | Age: 24
End: 2024-10-22

## 2024-10-22 NOTE — TELEPHONE ENCOUNTER
[employer]/ Return to work forms received via [employer] message on 10/18/24. [employer] message sent for authorization. Logged for processing.

## 2024-10-23 ENCOUNTER — OFFICE VISIT (OUTPATIENT)
Dept: FAMILY MEDICINE CLINIC | Facility: CLINIC | Age: 24
End: 2024-10-23
Payer: COMMERCIAL

## 2024-10-23 ENCOUNTER — LAB ENCOUNTER (OUTPATIENT)
Dept: LAB | Age: 24
End: 2024-10-23
Attending: FAMILY MEDICINE
Payer: COMMERCIAL

## 2024-10-23 VITALS
SYSTOLIC BLOOD PRESSURE: 118 MMHG | RESPIRATION RATE: 18 BRPM | WEIGHT: 179 LBS | HEIGHT: 69 IN | BODY MASS INDEX: 26.51 KG/M2 | HEART RATE: 68 BPM | DIASTOLIC BLOOD PRESSURE: 82 MMHG | OXYGEN SATURATION: 98 % | TEMPERATURE: 98 F

## 2024-10-23 DIAGNOSIS — Z01.818 PREOPERATIVE GENERAL PHYSICAL EXAMINATION: ICD-10-CM

## 2024-10-23 DIAGNOSIS — G89.29 CHRONIC PAIN OF RIGHT ANKLE: ICD-10-CM

## 2024-10-23 DIAGNOSIS — Z01.818 PREOPERATIVE GENERAL PHYSICAL EXAMINATION: Primary | ICD-10-CM

## 2024-10-23 DIAGNOSIS — M25.571 CHRONIC PAIN OF RIGHT ANKLE: ICD-10-CM

## 2024-10-23 DIAGNOSIS — Z23 NEED FOR VACCINATION: ICD-10-CM

## 2024-10-23 LAB
ALBUMIN SERPL-MCNC: 4.6 G/DL (ref 3.2–4.8)
ALBUMIN/GLOB SERPL: 1.5 {RATIO} (ref 1–2)
ALP LIVER SERPL-CCNC: 66 U/L
ALT SERPL-CCNC: 16 U/L
ANION GAP SERPL CALC-SCNC: 1 MMOL/L (ref 0–18)
AST SERPL-CCNC: 25 U/L (ref ?–34)
BASOPHILS # BLD AUTO: 0.02 X10(3) UL (ref 0–0.2)
BASOPHILS NFR BLD AUTO: 0.4 %
BILIRUB SERPL-MCNC: 0.9 MG/DL (ref 0.3–1.2)
BUN BLD-MCNC: 9 MG/DL (ref 9–23)
CALCIUM BLD-MCNC: 10 MG/DL (ref 8.7–10.4)
CHLORIDE SERPL-SCNC: 107 MMOL/L (ref 98–112)
CO2 SERPL-SCNC: 31 MMOL/L (ref 21–32)
CREAT BLD-MCNC: 1.15 MG/DL
EGFRCR SERPLBLD CKD-EPI 2021: 91 ML/MIN/1.73M2 (ref 60–?)
EOSINOPHIL # BLD AUTO: 0.13 X10(3) UL (ref 0–0.7)
EOSINOPHIL NFR BLD AUTO: 2.3 %
ERYTHROCYTE [DISTWIDTH] IN BLOOD BY AUTOMATED COUNT: 13.1 %
FASTING STATUS PATIENT QL REPORTED: NO
GLOBULIN PLAS-MCNC: 3.1 G/DL (ref 2–3.5)
GLUCOSE BLD-MCNC: 81 MG/DL (ref 70–99)
HCT VFR BLD AUTO: 39.9 %
HGB BLD-MCNC: 13.6 G/DL
IMM GRANULOCYTES # BLD AUTO: 0.02 X10(3) UL (ref 0–1)
IMM GRANULOCYTES NFR BLD: 0.4 %
LYMPHOCYTES # BLD AUTO: 1.46 X10(3) UL (ref 1–4)
LYMPHOCYTES NFR BLD AUTO: 25.8 %
MCH RBC QN AUTO: 30.1 PG (ref 26–34)
MCHC RBC AUTO-ENTMCNC: 34.1 G/DL (ref 31–37)
MCV RBC AUTO: 88.3 FL
MONOCYTES # BLD AUTO: 0.56 X10(3) UL (ref 0.1–1)
MONOCYTES NFR BLD AUTO: 9.9 %
NEUTROPHILS # BLD AUTO: 3.47 X10 (3) UL (ref 1.5–7.7)
NEUTROPHILS # BLD AUTO: 3.47 X10(3) UL (ref 1.5–7.7)
NEUTROPHILS NFR BLD AUTO: 61.2 %
OSMOLALITY SERPL CALC.SUM OF ELEC: 286 MOSM/KG (ref 275–295)
PLATELET # BLD AUTO: 225 10(3)UL (ref 150–450)
POTASSIUM SERPL-SCNC: 3.8 MMOL/L (ref 3.5–5.1)
PROT SERPL-MCNC: 7.7 G/DL (ref 5.7–8.2)
RBC # BLD AUTO: 4.52 X10(6)UL
SODIUM SERPL-SCNC: 139 MMOL/L (ref 136–145)
WBC # BLD AUTO: 5.7 X10(3) UL (ref 4–11)

## 2024-10-23 PROCEDURE — 90472 IMMUNIZATION ADMIN EACH ADD: CPT | Performed by: FAMILY MEDICINE

## 2024-10-23 PROCEDURE — 99213 OFFICE O/P EST LOW 20 MIN: CPT | Performed by: FAMILY MEDICINE

## 2024-10-23 PROCEDURE — 3074F SYST BP LT 130 MM HG: CPT | Performed by: FAMILY MEDICINE

## 2024-10-23 PROCEDURE — 3079F DIAST BP 80-89 MM HG: CPT | Performed by: FAMILY MEDICINE

## 2024-10-23 PROCEDURE — 3008F BODY MASS INDEX DOCD: CPT | Performed by: FAMILY MEDICINE

## 2024-10-23 PROCEDURE — 85025 COMPLETE CBC W/AUTO DIFF WBC: CPT

## 2024-10-23 PROCEDURE — 90656 IIV3 VACC NO PRSV 0.5 ML IM: CPT | Performed by: FAMILY MEDICINE

## 2024-10-23 PROCEDURE — 90715 TDAP VACCINE 7 YRS/> IM: CPT | Performed by: FAMILY MEDICINE

## 2024-10-23 PROCEDURE — 90471 IMMUNIZATION ADMIN: CPT | Performed by: FAMILY MEDICINE

## 2024-10-23 PROCEDURE — 80053 COMPREHEN METABOLIC PANEL: CPT

## 2024-10-23 PROCEDURE — 36415 COLL VENOUS BLD VENIPUNCTURE: CPT

## 2024-10-23 NOTE — PROGRESS NOTES
Family Medicine Progress Note    Lito Rosen is a 24 year old male.  ASSESSMENT AND PLAN:  Lito was seen today for pre-op exam.    Diagnoses and all orders for this visit:    Preoperative general physical examination  -     CBC With Differential With Platelet; Future  -     Comp Metabolic Panel (14) [E]; Future    Chronic pain of right ankle    Need for vaccination  -     INFLUENZA VACCINE, TRI, PRESERV FREE, 0.5 ML  -     TETANUS, DIPHTHERIA TOXOIDS AND ACELLULAR PERTUSIS VACCINE (TDAP), >7 YEARS, IM USE    Pt has no significant history of cardiac or pulmonary conditions. Functional capacity of greater than 4 MET's according to ACC/AHA guidelines. Estimated pre operative risk of major operative event is low based on combined clinical and surgical risks  Patient is cleared for the above states surgical procedure.      The patient indicates understanding of these issues and agrees to the plan.      Consuelo Sexton MD   10/23/24      HPI:   Lito Rosen is a 24 year old male with no significant past medical history seen for preoperative medical exam.    Procedure: Right ankle arthroscopy with extensive debridement, osteochondral repair of talus as needed, decompression of talus as needed.  Date: 10/31/24  Surgeon:   Anesthesia: to be decided    PAST MEDICAL HISTORY:     Past Medical History:    Allergic rhinitis due to pollen    Chondromalacia of patella    Flat foot(734)     PAST SURGICAL HISTORY:     Past Surgical History:   Procedure Laterality Date    Other surgical history      STRICTURE IN INTESTINES AT 1YRS OLD     ALLERGY:   Allergies[1]  MEDICATIONS:     No current outpatient medications on file.     FAMILY HISTORY:     Family History   Problem Relation Age of Onset    Hypertension Mother     Hypertension Maternal Grandfather     Cancer Neg     Heart Disease Neg     Stroke Neg        SOCIAL HISTORY:     Social History     Socioeconomic History    Marital status:  Single   Tobacco Use    Smoking status: Never    Smokeless tobacco: Never   Vaping Use    Vaping status: Never Used   Substance and Sexual Activity    Alcohol use: Yes     Comment: A drink or two maybe every 3-4 weeks    Drug use: Yes     Frequency: 1.0 times per week     Types: Cannabis   Other Topics Concern    Caffeine Concern No    Exercise No    Seat Belt No    Special Diet No    Stress Concern No    Weight Concern No     REVIEW OF SYSTEMS:   Review of Systems   Constitutional:  Negative for appetite change, fatigue, fever and unexpected weight change.   HENT:  Negative for congestion, ear pain, hearing loss and sore throat.    Eyes:  Negative for discharge, redness and visual disturbance.   Respiratory:  Negative for cough, chest tightness and shortness of breath.    Cardiovascular:  Negative for chest pain and palpitations.   Gastrointestinal:  Negative for abdominal pain, blood in stool, constipation and nausea.   Endocrine: Negative for cold intolerance, heat intolerance and polyuria.   Genitourinary:  Negative for difficulty urinating, dysuria, frequency and urgency.   Musculoskeletal:  Positive for arthralgias. Negative for gait problem, joint swelling and myalgias.   Skin:  Negative for rash.   Allergic/Immunologic: Negative for food allergies.   Neurological:  Negative for dizziness, weakness, numbness and headaches.   Hematological:  Negative for adenopathy. Does not bruise/bleed easily.   Psychiatric/Behavioral:  Negative for dysphoric mood and sleep disturbance. The patient is not nervous/anxious.         Denies anxiety and depression        PHYSICAL EXAM:   BP (!) 120/96   Pulse 68   Temp 98 °F (36.7 °C) (Temporal)   Resp 18   Ht 5' 9\" (1.753 m)   Wt 179 lb (81.2 kg)   SpO2 98%   BMI 26.43 kg/m²     Physical Exam  Constitutional:       General: He is not in acute distress.     Appearance: Normal appearance. He is well-developed and normal weight.   HENT:      Head: Normocephalic and  atraumatic.      Right Ear: Tympanic membrane, ear canal and external ear normal.      Left Ear: Tympanic membrane, ear canal and external ear normal.      Nose: Nose normal.      Mouth/Throat:      Mouth: Mucous membranes are moist.      Pharynx: Oropharynx is clear.   Eyes:      Extraocular Movements: Extraocular movements intact.      Conjunctiva/sclera: Conjunctivae normal.      Pupils: Pupils are equal, round, and reactive to light.   Neck:      Thyroid: No thyromegaly.   Cardiovascular:      Rate and Rhythm: Normal rate and regular rhythm.      Pulses: Normal pulses.      Heart sounds: Normal heart sounds. No murmur heard.  Pulmonary:      Effort: Pulmonary effort is normal. No respiratory distress.      Breath sounds: Normal breath sounds.   Chest:      Chest wall: No tenderness.   Abdominal:      General: Bowel sounds are normal. There is no distension.      Palpations: Abdomen is soft. There is no hepatomegaly, splenomegaly or mass.      Tenderness: There is no abdominal tenderness.   Musculoskeletal:         General: Normal range of motion.      Cervical back: Normal range of motion and neck supple.   Lymphadenopathy:      Cervical: No cervical adenopathy.   Skin:     General: Skin is warm.      Findings: No rash.   Neurological:      General: No focal deficit present.      Mental Status: He is alert and oriented to person, place, and time.      Deep Tendon Reflexes: Reflexes are normal and symmetric.   Psychiatric:         Mood and Affect: Mood normal.         Behavior: Behavior normal.         Thought Content: Thought content normal.         Judgment: Judgment normal.            LABS AND IMAGING:     Component      Latest Ref [facility] 10/23/2024   WBC      4.0 - 11.0 x10(3) uL 5.7    RBC      4.30 - 5.70 x10(6)uL 4.52    Hemoglobin      13.0 - 17.5 g/dL 13.6    Hematocrit      39.0 - 53.0 % 39.9    Platelet Count      150.0 - 450.0 10(3)uL 225.0    MCV      80.0 - 100.0 fL 88.3    MCH      26.0 - 34.0 pg 30.1     MCHC      31.0 - 37.0 g/dL 34.1    RDW      % 13.1    Prelim Neutrophil Abs      1.50 - 7.70 x10 (3) uL 3.47    Neutrophils Absolute      1.50 - 7.70 x10(3) uL 3.47    Lymphocytes Absolute      1.00 - 4.00 x10(3) uL 1.46    Monocytes Absolute      0.10 - 1.00 x10(3) uL 0.56    Eosinophils Absolute      0.00 - 0.70 x10(3) uL 0.13    Basophils Absolute      0.00 - 0.20 x10(3) uL 0.02    Immature Granulocyte Absolute      0.00 - 1.00 x10(3) uL 0.02    Neutrophils %      % 61.2    Lymphocytes %      % 25.8    Monocytes %      % 9.9    Eosinophils %      % 2.3    Basophils %      % 0.4    Immature Granulocyte %      % 0.4    Glucose      70 - 99 mg/dL 81    Sodium      136 - 145 mmol/L 139    Potassium      3.5 - 5.1 mmol/L 3.8    Chloride      98 - 112 mmol/L 107    Carbon Dioxide, Total      21.0 - 32.0 mmol/L 31.0    ANION GAP      0 - 18 mmol/L 1    BUN      9 - 23 mg/dL 9    CREATININE      0.70 - 1.30 mg/dL 1.15    CALCIUM      8.7 - 10.4 mg/dL 10.0    CALCULATED OSMOLALITY      275 - 295 mOsm/kg 286    EGFR      >=60 mL/min/1.73m2 91    AST (SGOT)      <34 U/L 25    ALT (SGPT)      10 - 49 U/L 16    ALKALINE PHOSPHATASE      45 - 117 U/L 66    Total Bilirubin      0.3 - 1.2 mg/dL 0.9    PROTEIN, TOTAL      5.7 - 8.2 g/dL 7.7    Albumin      3.2 - 4.8 g/dL 4.6    Globulin      2.0 - 3.5 g/dL 3.1    A/G Ratio      1.0 - 2.0  1.5    Patient Fasting for CMP? No             The 21st Century Cures Act makes medical notes like these available to patients in the interest of transparency. Please be advised this is a medical document. Medical documents are intended to carry relevant information, facts as evident, and the clinical opinion of the practitioner. The medical note is intended as peer to peer communication and may appear blunt or direct. It is written in medical language and may contain abbreviations or verbiage that are unfamiliar.     [unfilled]             [1] No Known Allergies

## 2024-10-29 ENCOUNTER — MED REC SCAN ONLY (OUTPATIENT)
Dept: FAMILY MEDICINE CLINIC | Facility: CLINIC | Age: 24
End: 2024-10-29

## 2024-10-31 ENCOUNTER — PROCEDURE (OUTPATIENT)
Facility: LOCATION | Age: 24
End: 2024-10-31

## 2024-10-31 DIAGNOSIS — Z98.890 POST-OPERATIVE STATE: Primary | ICD-10-CM

## 2024-10-31 DIAGNOSIS — M25.571 RIGHT ANKLE PAIN, UNSPECIFIED CHRONICITY: ICD-10-CM

## 2024-10-31 DIAGNOSIS — R93.7 BONE MARROW EDEMA: ICD-10-CM

## 2024-10-31 DIAGNOSIS — M25.471 RIGHT ANKLE EFFUSION: ICD-10-CM

## 2024-10-31 DIAGNOSIS — M95.8 OSTEOCHONDRAL DEFECT OF TALUS: ICD-10-CM

## 2024-10-31 RX ORDER — HYDROCODONE BITARTRATE AND ACETAMINOPHEN 5; 325 MG/1; MG/1
1 TABLET ORAL EVERY 6 HOURS PRN
Qty: 28 TABLET | Refills: 0 | Status: SHIPPED | OUTPATIENT
Start: 2024-10-31 | End: 2024-11-07

## 2024-10-31 NOTE — PROGRESS NOTES
[facility], [address], [place]     OPERATIVE NOTE     Lito Rosen Patient Status:  No patient class for patient encounter    2000 MRN [id number]   Location [facility] Attending No att. providers found   Hosp Day # 0 PCP Consuelo Sexton MD     Date of Surgery:  10/31/2024     Preoperative Diagnosis:   Osteochondral lesion of right talus  Bone marrow edema, right talus  Ankle effusion, right  Right ankle pain    Postoperative Diagnosis:   Same    Primary Surgeon: Calixto Lewis DPM    Assistant: Santiago Lennon, PGY-3    Procedures:   Right ankle arthroscopy with extensive debridement  Decompression of talus, right    Surgical Findings: Acute and chronic synovitis, plicae and fibrotic bands noted throughout anterior ankle joint and medial and lateral ankle gutters.  Articular cartilage of talus was intact with no defects noted.    Anesthesia: General     Complications: Small metal shards noted following debridement, most likely from shaver.  Direct visualization via arthroscope confirmed all shards were removed from the ankle.  The ankle was flushed with copious amounts of sterile saline.      Estimated Blood Loss: 2 cc    Implants: * No surgical log found *    Specimen: None    Drains: None    Condition: Stable      Preoperative history/indications:  Patient presented to Calixto Lewis DPM due to ongoing right ankle pain following a soccer injury.  All preoperative conservative care have failed to resolve the patient's pain and discomfort.  MRI confirmed OCD lesion/marrow edema of talus with ankle effusion.  The procedure was discussed with patient in detail.  All of their questions and concerns were answered and the patient would like to proceed with surgical intervention.    Informed Consent:  All treatment options have been discussed with the patient of both conservative and surgical attempt at correction including the potential risks and complications of surgical intervention  including but not exhaustive of death, loss of limb, post op pain, swelling, infection, bleeding, extended healing, and the possibility of further and future surgery.  No guarantees have been made to the pt and the informed consent has been signed.    Procedure in detail:  The patient was brought into the operating room and placed on the operating table in the supine position.  Pre-operative antibiotics were given per SCIP protocols.  A pneumatic tourniquet was placed about the patient's right thigh.  A formal timeout was performed and the OR staff were all in agreement. Following IV sedation, local anesthesia was then obtained about the right ankle utilizing 20 cc's of 0.5% marcaine plain.  The right lower extremity was then scrubbed, prepped, and draped in the usual aseptic manner.  The right lower extremity was then exsanguinated and the pneumatic tourniquet was inflated.  Attention was directed to the anterior medial ankle where the ankle was infiltrated with 10 cc of sterile saline.  Next, attention was directed to the anteromedial ankle portal site where a 1cm stab incision was made medial to the TA tendon with care being taken to avoid the saphenous nerve and vein.  Hemostat was used to bluntly dissect down to the joint capsule.  The capsule was punctured and the arthroscopic cannula with the obturator were inserted in the joint.  The obturator was removed and a 2.7 mm 30 degree scope was placed through the anteromedial portal. The ankle joint was then visualized confirming location of the scope.   Attention was then directed to the anterolateral ankle portal site where the scope was used to transilluminate the lateral aspect of the ankle ensuring that all neurovascular structures were safe. The anterior lateral ankle portal was then established by placing a stab incision lateral to the EDL with care being taken to avoid the superficial peroneal nerve.  Hemostat was utilized to dissect down to the joint  capsule.  A shaver was inserted through the joint and suction was attached for fluid egress.  There is noted to be significant acute and chronic synovitis within the anterior ankle joint. Debridement of the the ankle joint synovitis was performed using the shaver in an oscillating setting and excellent identification of the ant joint line and medial and lateral gutters were achieved.  All synovitis, fibrotic bands, and plicae were debrided with the shaver under direct visualization.  There was noted to be fibrotic bands within both the medial and lateral ankle gutters, which were all debrided with shaver.  Following debridement, there was noted to be a small amount of metal shards within the anterior ankle tissue, most likely from the shaver.  All shards were directly visualized via arthroscope and all were removed.  A blunt probe was then utilized to inspect the articular cartilage, which showed no osteochondral lesions, particularly in the lateral aspect of the talus.  After confirming adequate debridement, all instrumentation was removed and further flushing of the ankle joint was performed utilizing saline.   The portals were then closed with 3-0 nylon.   Next, attention was next directed to the lateral aspect of the ankle where a 0.045 k-wire was introduced into the lateral talus under fluoroscopic guidance for decompression of talar lesion.  Using fluoro, 4 passes of the k-wire into the lateral talus was performed, with care not to penetrate the subchondral bone/articular cartilage of the talus.   Upon completion of the procedure, the pneumatic tourniquet was deflated and a prompt hyperemic response was noted to the surgical extremity.  The incisions were dressed with betadine-soaked adaptic and covered with sterile compressive dressings consisting of 4x4 gauze, ABD pad, kerlix, and cast padding.  A well-padded posterior splint and ace wrap was then applied to the surgical extremity.  The pt tolerated the  procedure and anesthesia well.  They were transferred to the recovery room with VSS and vascular status intact to surgical extremity.  Following a period of postoperative monitoring, the patient will be discharged home on the following written and oral postop instructions: keep dressings C/D/I, remain nonweightbearing to surgical extremity in posterior splint, ice and elevate surgical extremity when at rest.  Patient was prescribed norco for pain.  Patient will contact my office for all postoperative f/u care and should any problems arise.      Calixto Lewis DPM   10/31/2024

## 2024-11-02 ENCOUNTER — TELEPHONE (OUTPATIENT)
Dept: ORTHOPEDICS CLINIC | Facility: CLINIC | Age: 24
End: 2024-11-02

## 2024-11-02 NOTE — TELEPHONE ENCOUNTER
Procedure date:10/31/2024/Right ankle arthroscopy with extensive debridement  Decompression of talus, right    How are you feeling? / Good  Any bleeding? / no   Is the dressing dry & intact? / yes  Level of pain? / 5-6  Character of pain: / did not mention  Onset of pain:/ 11/1/2024  Aggravating factors:hanging down too long  Duration of pain:/ until leg is elevated  Alleviating factors:/ as above  Are you taking the prescribed medication? / yes  Medication Quantity Refills Start End   HYDROcodone-acetaminophen 5-325 MG Oral Tab         Are you following all of the PO instructions? Appetite good    Other Comments:We went over ice protocol under the knee 20min on and 20min off multiple times a day. He will try to use tylenol 500mg aware of 3000mg-4000mg in 24 hours including the 325mg in norco    Follow-up appt  date: 11/8/2024    Pt was advised if they have any concerns after hours to call our office and they would be directed to on call physician. / DONE

## 2024-11-04 NOTE — TELEPHONE ENCOUNTER
Called patient to obtain details for  fitness for duty form. Patient stated form no longer needs completion and he will be working from home. Patient would like a letter stating updated restrictions from provider. Advised patient to contact providers office for a letter. Patient verbalized understanding. Form placed in Archive.

## 2024-11-08 ENCOUNTER — OFFICE VISIT (OUTPATIENT)
Facility: LOCATION | Age: 24
End: 2024-11-08
Payer: COMMERCIAL

## 2024-11-08 DIAGNOSIS — Z98.890 POST-OPERATIVE STATE: Primary | ICD-10-CM

## 2024-11-08 DIAGNOSIS — M25.571 RIGHT ANKLE PAIN, UNSPECIFIED CHRONICITY: ICD-10-CM

## 2024-11-08 PROCEDURE — 99024 POSTOP FOLLOW-UP VISIT: CPT | Performed by: PODIATRIST

## 2024-11-08 NOTE — PROGRESS NOTES
[facility]  Progress Note  11/8/2024    Lito Rosen is a 24 year old male.   Chief Complaint   Patient presents with    Post-Op     R ankle - 1st visit - had sx on 10/31/24 - he is NWB  with a scooter - has no pain - sometimes has some numbness and tingling in his toes          HPI:     Patient is status post 1 week from right ankle arthroscopy with extensive debridement and decompression of the talus of the right lower extremity (DOS: 10/31/2024).  Patient states he is doing well overall.  He has been nonweightbearing in his posterior splint with a knee scooter.  He is denying any current pain.  He states that he does have occasional numbness and tingling in his toes, but overall has no concerns.  Denies recent nausea, vomiting, fevers, chills, shortness of breath, chest pain, calf pain.      Allergies: Patient has no known allergies.   No current outpatient medications on file.      Past Medical History:    Allergic rhinitis due to pollen    Chondromalacia of patella    Flat foot(734)      Past Surgical History:   Procedure Laterality Date    Other surgical history      STRICTURE IN INTESTINES AT 1YRS OLD      Family History   Problem Relation Age of Onset    Hypertension Mother     Hypertension Maternal Grandfather     Cancer Neg     Heart Disease Neg     Stroke Neg       Social History     Socioeconomic History    Marital status: Single   Tobacco Use    Smoking status: Never    Smokeless tobacco: Never   Vaping Use    Vaping status: Never Used   Substance and Sexual Activity    Alcohol use: Yes     Comment: A drink or two maybe every 3-4 weeks    Drug use: Yes     Frequency: 1.0 times per week     Types: Cannabis   Other Topics Concern    Caffeine Concern No    Exercise No    Seat Belt No    Special Diet No    Stress Concern No    Weight Concern No           REVIEW OF SYSTEMS:     10 point ROS completed and was negative unless stated in HPI.      EXAM:     General: NAD, appropriate and  cooperative.  Vascular: Distal pulses intact b/l.   Integument: Incisions well approximated with sutures intact prior to removal today. No dehiscence or drainage, no SOI.  Neurological: Sensation and motor function intact and symmetric compatible with pre-operative state.  Musculoskeletal: Status post ankle arthroscopy with extensive debridement and decompression of right talus.  Patient has free range of motion of ankle joint with no pain on exam.  Mild discomfort with palpation to anterior lateral and posterior lateral ankle joint, right     ASSESSMENT AND PLAN:   Diagnoses and all orders for this visit:    Post-operative state    Right ankle pain, unspecified chronicity        Plan:   -Patient was seen and evaluated today in clinic.  Chart history reviewed.    Patient is status post right ankle arthroscopy with extensive debridement and decompression of right talus (DOS: 10/31/2024)    Patient is doing well overall.  Sutures intact with no current signs of infection or dehiscence.  Sutures removed today without incident    Surgical sites painted with Betadine and covered with Band-Aid.  Patient will monitor for signs of infection and will keep site dry until no further drainage is noted.  Compressive Ace bandage applied to right lower extremity    Patient properly fitted for and provided with a cam boot today    Patient will remain nonweightbearing to surgical extremity in cam boot at this time.    Patient will continue with medications as prescribed    Recommend continue resting, icing, elevating surgical extremity    Will discuss moving forward with physical therapy at next visit    Educated patient on signs of infection and encouraged them to contact my office and seek immediate medical attention if noticing any of the signs.      -All of the patient's questions and concerns were addressed.  They indicated their understanding of these issues and agrees to the plan.    Time spent reviewing pertinent information  from patient's chart, reviewing any pertinent imaging, obtaining history and physical exam, discussing and mutually agreeing on a treatment plan, and documenting encounter: 20 minutes    RTC 3 weeks to discuss moving forward with physical therapy      Calixto Lewis DPM        [address] [address]   [email or web address]            [employer] speech recognition software was used to prepare this note.  Errors in word recognition may occur.  Please contact me with any questions/concerns with this note.

## 2024-12-04 ENCOUNTER — OFFICE VISIT (OUTPATIENT)
Facility: LOCATION | Age: 24
End: 2024-12-04
Payer: COMMERCIAL

## 2024-12-04 DIAGNOSIS — M95.8 OSTEOCHONDRAL DEFECT OF TALUS: ICD-10-CM

## 2024-12-04 DIAGNOSIS — Z98.890 POST-OPERATIVE STATE: Primary | ICD-10-CM

## 2024-12-04 DIAGNOSIS — M25.571 RIGHT ANKLE PAIN, UNSPECIFIED CHRONICITY: ICD-10-CM

## 2024-12-04 DIAGNOSIS — R93.7 BONE MARROW EDEMA: ICD-10-CM

## 2024-12-04 PROCEDURE — 99024 POSTOP FOLLOW-UP VISIT: CPT | Performed by: PODIATRIST

## 2024-12-04 NOTE — PROGRESS NOTES
[facility]  Progress Note      Lito Rosen is a 24 year old male.   Chief Complaint   Patient presents with    Post-Op     Surgery was 10/31/24, right ankle. Patient rates pain 3/10. He denies any tingling or numbness.         HPI:     Patient is status post 4 weeks from right ankle arthroscopy with extensive debridement and decompression of the talus of the right lower extremity (DOS: 10/31/2024).  Patient states that he is doing well overall.  States that he got out of the cam boot and has been ambulating in tennis shoes since last Friday, 11/29.  Patient states that he has mild discomfort, rating the pain 3/10.  He does state that it is intermittent with certain motions.  Denies any numbness or tingling.  He is in a Ace bandage today.  Overall, patient states that he is better compared to prior to surgery.  No other concerns.      Allergies: Patient has no known allergies.   No current outpatient medications on file.      Past Medical History:    Allergic rhinitis due to pollen    Chondromalacia of patella    Flat foot(734)      Past Surgical History:   Procedure Laterality Date    Other surgical history      STRICTURE IN INTESTINES AT 1YRS OLD      Family History   Problem Relation Age of Onset    Hypertension Mother     Hypertension Maternal Grandfather     Cancer Neg     Heart Disease Neg     Stroke Neg       Social History     Socioeconomic History    Marital status: Single   Tobacco Use    Smoking status: Never    Smokeless tobacco: Never   Vaping Use    Vaping status: Never Used   Substance and Sexual Activity    Alcohol use: Yes     Comment: A drink or two maybe every 3-4 weeks    Drug use: Yes     Frequency: 1.0 times per week     Types: Cannabis   Other Topics Concern    Caffeine Concern No    Exercise No    Seat Belt No    Special Diet No    Stress Concern No    Weight Concern No           REVIEW OF SYSTEMS:     10 point ROS completed and was negative unless stated in HPI.      EXAM:      General: NAD, appropriate and cooperative.  Vascular: Distal pulses intact b/l.   Integument: Incisions well healed. No dehiscence or drainage, no SOI.  Neurological: Sensation and motor function intact and symmetric compatible with pre-operative state.  Musculoskeletal: Status post ankle arthroscopy with extensive debridement and decompression of right talus.  Patient has free range of motion of ankle joint with no pain, catching, clicking noted on exam.  No pain with palpation to ankle joint today.       ASSESSMENT AND PLAN:   Diagnoses and all orders for this visit:    Post-operative state    Right ankle pain, unspecified chronicity    Osteochondral defect of talus    Bone marrow edema        Plan:   -Patient was seen and evaluated today in clinic.  Chart history reviewed.    Patient is status post right ankle arthroscopy with extensive debridement and decompression of right talus (DOS: 10/31/2024)     Patient is doing well overall.  Surgical site well-healed.  No discomfort on exam today.     Patient will continue with supportive shoe gear at this time.  Recommending ankle brace for support for the time being.  Patient was properly fitted for and provided with a lace up ankle brace today    Recommending we move forward with formal physical therapy at this time.  A referral was placed today.     Recommend continue resting, icing, elevating surgical extremity and avoid any activities that increases pain or discomfort        Educated patient on signs of infection and encouraged them to contact my office and seek immediate medical attention if noticing any of the signs.      -All of the patient's questions and concerns were addressed.  They indicated their understanding of these issues and agrees to the plan.    Time spent reviewing pertinent information from patient's chart, reviewing any pertinent imaging, obtaining history and physical exam, discussing and mutually agreeing on a treatment plan, and  documenting encounter: 20 minutes    RTC 4 to 6 weeks    Calixto Lewis DPM        12/4/2024    [facility]  [id number] [address] [address]   [email or web address]            [employer] speech recognition software was used to prepare this note.  Errors in word recognition may occur.  Please contact me with any questions/concerns with this note.

## 2024-12-06 ENCOUNTER — OFFICE VISIT (OUTPATIENT)
Dept: PHYSICAL THERAPY | Age: 24
End: 2024-12-06
Attending: PODIATRIST
Payer: COMMERCIAL

## 2024-12-06 DIAGNOSIS — M95.8 OSTEOCHONDRAL DEFECT OF TALUS: ICD-10-CM

## 2024-12-06 DIAGNOSIS — R93.7 BONE MARROW EDEMA: ICD-10-CM

## 2024-12-06 DIAGNOSIS — Z98.890 POST-OPERATIVE STATE: Primary | ICD-10-CM

## 2024-12-06 DIAGNOSIS — M25.571 RIGHT ANKLE PAIN, UNSPECIFIED CHRONICITY: ICD-10-CM

## 2024-12-06 PROCEDURE — 97161 PT EVAL LOW COMPLEX 20 MIN: CPT

## 2024-12-06 NOTE — PROGRESS NOTES
LOWER EXTREMITY EVALUATION:     Diagnosis:   Post-op condition R ankle, Debridement  DOS: 10/31/2024    Referring Provider: Jonny Lewis  Date of Evaluation:    12/6/2024    Precautions:     Next MD visit:   none scheduled  Date of Surgery: n/a     PATIENT SUMMARY   Lito Rosen is a 24 year old male who presents to therapy today with complaints of weakness in the R leg and hip pain 3/10 X DOI and after the surgery decreased strength and instability in the ankle. Patient has undergone surgical debridement of the ankle and decompression of the Talus on 10/31/2024.  Pt describes pain level current 0/10, at best 0/10, at worst 4/10. Mostly in the anterior and lateral aspect of the ankle.   Current functional limitations include Under post-surgical restrictions. Since DOS the ankle was immobilized in a splint for 1 week and then got the CAM boot for 4 weeks and was weaned of on 12/4/2024. Now the patient is presently advised to be in a soft brace by the surgeon    Lito describes prior level of function played competitive soccer, highly active and never had complaints of ankle pain or instability in the ankle before the injury. Pt goals include return to sports and greater ankle strength and overall improvement in the .  Past medical history was reviewed with Lito. Significant findings include pt had a hamstring tear in 2020, Bad ankle injury in april /2023,  it was a STI while playing soccer, took PT session but that didn't help. Retook 8-10 sessions and didnt help and then opted for surgery ( arthroscopy)  Pt reported that felt R ankle weakness and had problems with going up and downstairs because of which he started favoring the R leg and L became stronger and over used.    ASSESSMENT  Lito presents to physical therapy evaluation with primary c/o post -operative condition* . The results of the objective tests and measures show there is ankle instability with increased inversion and plantar flexion strength,  decreased strength in the hipe musculature and tightness in the L hamstrings..  Functional deficits include but are not limited to see above.  Signs and symptoms are consistent with diagnosis of Post-injury and post-surgical  weakness and instability in ankle and disuse atrophy of R LE and loss of balance and proprioception. Pt and PT discussed evaluation findings, pathology, POC and HEP.  Pt voiced understanding and performs HEP correctly without reported pain. Skilled Physical Therapy is medically necessary to address the above impairments and reach functional goals.     OBJECTIVE:   Observation: Pt's R foot is in neutral flexion, the pt keeps the leg in ER at the hip and ankle in mild inversion R <L  Palpation: No tenderness elicited  Sensation: same    AROM: (* denotes performed with pain)  Hip Knee Foot/Ankle   Flexion: R WFL; L Tightness in hams  Extension: R wfl; L WFL  Abduction: R WFL; L WFL  ER: R WFL; L WFL  IR: R WF; L WFL Flexion: R WFL; L WFL  Extension: R WFL; L WFL    DF: R WFL L WFL  PF: R WFL; L WFL  INV: R WFL; L WFL  EV: R WFL; L WFL  Great toe ext: R WFL; L WFL       Flexibility:  General tightnessin all the muscles of thighB/L L>R. Calf tightness    Strength/MMT: (* denotes performed with pain)  Hip Knee Foot/Ankle   Flexion: R 4/5; L 4/5  Extension: R 4/5; L 4/5  Abduction: R 5/5; L 5/5  ER: R 5/5; L 5/5  IR: R 5/5; L 5/5 Flexion: R 4/5; L 4/5  Extension: R 4/5; L 4/5    DF: R 3+/5; L 4+/5  PF: R 3+/5; L 4+/5  INV: R 3+/5; L 5/5  EV: R 3+/5; L 5/5  Great toe ext: R 3/5; L 3/5     Special tests:   none    Gait: pt ambulates on level ground with normal mechanics with brace on WBAT  Balance: SLS: R NT sec, L 10+ sec    Today’s Treatment and Response:   Pt education was provided on exam findings, treatment diagnosis, treatment plan, expectations, and prognosis. Pt was also provided recommendations for possible soreness after evaluation and postural corrections.  Patient was instructed in and  issued a HEP for: Access Code: [email or web address]  URL: [email or web address]  Date: 12/06/2024  Prepared by: Nayeli Farooq    Exercises  - Mini Squat with Counter Support  - 1 x daily - 7 x weekly - 2 sets - 10 reps  - Seated Heel Raise  - 1 x daily - 7 x weekly - 2 sets - 10 reps  - Long Sitting Ankle Plantar Flexion with Resistance  - 1 x daily - 7 x weekly - 2 sets - 10 reps  - Long Sitting Ankle Eversion with Resistance  - 1 x daily - 7 x weekly - 2 sets - 10 reps  - Long Sitting Ankle Inversion with Resistance  - 1 x daily - 7 x weekly - 2 sets - 10 reps  - Long Sitting Ankle Dorsiflexion with Anchored Resistance  - 1 x daily - 7 x weekly - 2 sets - 10 reps  - Towel Scrunches  - 1 x daily - 7 x weekly - 2 sets - 10 reps  - Standing Hamstring Stretch with Step  - 1 x daily - 7 x weekly - 2 sets - 10 reps  - Standing Weight Shift  - 1 x daily - 7 x weekly - 2 sets - 10 reps    Charges: PT Eval Low Complexity,       Total Timed Treatment: 45 min     Total Treatment Time: 45 min     Based on clinical rationale and outcome measures, this evaluation involved Low Complexity decision making   PLAN OF CARE:    Goals: (to be met in 10 visits)  Pt will demonstrate improved DF AROM to >= 10 degrees to promote proper foot clearance during gait and greater ease descending stairs without compensation  Pt will have increased ankle strength to 4+/5 throughout for improved ankle control with ADLs such as prolonged gait and stair negotiation   Pt will have improved SLS to >10s for increased ankle stability with ambulation on uneven surfaces such as gravel and grass   Pt will report <1/10 pain with Full WB and SLS.  Pt will be independent and compliant with comprehensive HEP to maintain progress achieved in PT.  Greater ankle stability and strength in hip musculature.    Frequency / Duration: Patient will be seen for 2 x/week or a total of 10 visits over a 90 day period. Treatment will include: Gait training, Manual  Therapy, Therapeutic Exercise, and Home Exercise Program instruction    Education or treatment limitation: None  Rehab Potential:good    LEFS Score  LEFS Score: (Patient-Rptd) 68.75 % (12/6/2024 12:46 PM)      Patient/Family/Caregiver was advised of these findings, precautions, and treatment options and has agreed to actively participate in planning and for this course of care.    Thank you for your referral. Please co-sign or sign and return this letter via fax as soon as possible to [phone]. If you have any questions, please contact me at Dept: [phone]    Sincerely,  Electronically signed by therapist: Nayeli Farooq PT  Physician's certification required: Yes  I certify the need for these services furnished under this plan of treatment and while under my care.    X___________________________________________________ Date____________________    Certification From: 12/6/2024  To:3/6/2025

## 2024-12-09 ENCOUNTER — OFFICE VISIT (OUTPATIENT)
Dept: PHYSICAL THERAPY | Age: 24
End: 2024-12-09
Attending: PODIATRIST
Payer: COMMERCIAL

## 2024-12-09 DIAGNOSIS — G89.29 CHRONIC PAIN OF RIGHT ANKLE: ICD-10-CM

## 2024-12-09 DIAGNOSIS — M25.571 CHRONIC PAIN OF RIGHT ANKLE: ICD-10-CM

## 2024-12-09 DIAGNOSIS — Z98.890 POST-OPERATIVE STATE: Primary | ICD-10-CM

## 2024-12-09 PROCEDURE — 97140 MANUAL THERAPY 1/> REGIONS: CPT

## 2024-12-09 PROCEDURE — 97110 THERAPEUTIC EXERCISES: CPT

## 2024-12-09 NOTE — PROGRESS NOTES
Diagnosis:   Ankle Post-op condition      Referring Provider: Jonny Lewis  Date of Evaluation:    12/06/2024    Precautions:  None Next MD visit:   none scheduled  Date of Surgery: n/a   Insurance Primary/Secondary: [place] / N/A     # Auth Visits: 8            Subjective: Pt reported minimal pain only with walking and stairs. Used recumbent bike for ~30 min over the weekend and no pain was reported with that.    Pain: 1/10      Objective: Ankle tight in all movements except inversion. DF> Eversion > PF. Strength 4/5      Assessment: Patient tolerated the glides, grade 3 and 4 well with no pain. All the exercises were performed without pain but couldn't do the standing abductions of hip with GTB      Goals:   Same as set  in evaluation    Plan: continue treatment as planned in the initial visist  Date: 12/9/2024  TX#: 2/8 Date:                 TX#: 3/ Date:                 TX#: 4/ Date:                 TX#: 5/ Date:   Tx#: 6/   Manual mobilization of ankle in DF, PF and Eversion. Grade 2, 3 and 4       Hold relax for ankle strengthening in DF, PF and eversion       Towel scrunches       RTB resisted ankle movements   10 X 5 sec hold x 2 sets       Proprioception  Foam sheet  marching x 5 min        Abduction at hip   Red TB  10 reps x 5 sec hold x 2 sets X B/L       HEP: Access Code: [email or web address]  URL: [email or web address]  Date: 12/09/2024  Prepared by: Nayeli Farooq    Exercises  - Mini Squat with Counter Support  - 1 x daily - 7 x weekly - 2 sets - 10 reps  - Seated Heel Raise  - 1 x daily - 7 x weekly - 2 sets - 10 reps  - Long Sitting Ankle Plantar Flexion with Resistance  - 1 x daily - 7 x weekly - 2 sets - 10 reps  - Long Sitting Ankle Eversion with Resistance  - 1 x daily - 7 x weekly - 2 sets - 10 reps  - Long Sitting Ankle Inversion with Resistance  - 1 x daily - 7 x weekly - 2 sets - 10 reps  - Long Sitting Ankle Dorsiflexion with Anchored Resistance  - 1 x daily - 7 x weekly - 2  sets - 10 reps  - Towel Scrunches  - 1 x daily - 7 x weekly - 2 sets - 10 reps  - Standing Hamstring Stretch with Step  - 1 x daily - 7 x weekly - 2 sets - 10 reps  - Standing Weight Shift  - 1 x daily - 7 x weekly - 2 sets - 10 reps  - Clamshell with Resistance  - 1 x daily - 7 x weekly - 2 sets - 10 reps    Charges:        Total Timed Treatment: 45 min  Total Treatment Time: 45 min

## 2024-12-11 ENCOUNTER — TELEPHONE (OUTPATIENT)
Dept: PHYSICAL THERAPY | Facility: HOSPITAL | Age: 24
End: 2024-12-11

## 2024-12-11 ENCOUNTER — APPOINTMENT (OUTPATIENT)
Dept: PHYSICAL THERAPY | Age: 24
End: 2024-12-11
Attending: PODIATRIST
Payer: COMMERCIAL

## 2024-12-16 ENCOUNTER — OFFICE VISIT (OUTPATIENT)
Dept: PHYSICAL THERAPY | Age: 24
End: 2024-12-16
Attending: PODIATRIST
Payer: COMMERCIAL

## 2024-12-16 PROCEDURE — 97110 THERAPEUTIC EXERCISES: CPT

## 2024-12-16 PROCEDURE — 97140 MANUAL THERAPY 1/> REGIONS: CPT

## 2024-12-16 NOTE — PROGRESS NOTES
Diagnosis:   Ankle Post-op condition      Referring Provider: Jonny Lewis  Date of Evaluation:    12/06/2024    Precautions:  None Next MD visit:   none scheduled  Date of Surgery: n/a   Insurance Primary/Secondary: [place] / N/A     # Auth Visits: 8            Subjective: The patient reported that he is exercising and started with stationary bike but reported getting sore after that. Pt. Told the PT that after the last visit too, the foot was hurting especially at the ankle joint.  Pain: 2/10  Objective: Ankle tight in all movements except inversion. DF> Eversion > PF. Strength 4/5. DF ROM is 10'  There is mild swelling in R ankle just at the anterior and lateral margins of the joint.      Assessment: Patient tolerated the glides, grade 1 snd 2 well with no pain. All the exercises were performed without pain. Weight shifts were performed in the // bars.  Goals:   Same as set  in evaluation    Plan: continue treatment as planned in the initial visist  Date: 12/9/2024  TX#: 2/8 Date:  12/16/2024              TX#: 3/8 Date:                 TX#: 4/ Date:                 TX#: 5/ Date:   Tx#: 6/   Manual mobilization of ankle in DF, PF and Eversion. Grade 2, 3 and 4 Manual mobilization of ankle in DF, PF and Eversion. Grade 1 and 2      Hold relax for ankle strengthening in DF, PF and eversion Lymphatic drainage of the ankle to reduce swelling      Towel scrunches Towel scrunches 1min      RTB resisted ankle movements   10 X 5 sec hold x 2 sets YTB Resisted ankle movements   10 X 5 sec hold x 2 sets      Proprioception  Foam sheet  marching x 5 min  Proprioception  Ankle disc x 5 min       Abduction at hip   Red TB  10 reps x 5 sec hold x 2 sets X B/L Abduction at hip   Red TB  10 reps x 5 sec hold x 2 sets X B/L      HEP: Access Code: [email or web address]  URL: [email or web address]  Date: 12/09/2024  Prepared by: Nayeli Farooq    Exercises  - Mini Squat with Counter Support  - 1 x daily - 7 x weekly - 2  sets - 10 reps  - Seated Heel Raise  - 1 x daily - 7 x weekly - 2 sets - 10 reps  - Long Sitting Ankle Plantar Flexion with Resistance  - 1 x daily - 7 x weekly - 2 sets - 10 reps  - Long Sitting Ankle Eversion with Resistance  - 1 x daily - 7 x weekly - 2 sets - 10 reps  - Long Sitting Ankle Inversion with Resistance  - 1 x daily - 7 x weekly - 2 sets - 10 reps  - Long Sitting Ankle Dorsiflexion with Anchored Resistance  - 1 x daily - 7 x weekly - 2 sets - 10 reps  - Towel Scrunches  - 1 x daily - 7 x weekly - 2 sets - 10 reps  - Standing Hamstring Stretch with Step  - 1 x daily - 7 x weekly - 2 sets - 10 reps  - Standing Weight Shift  - 1 x daily - 7 x weekly - 2 sets - 10 reps  - Clamshell with Resistance  - 1 x daily - 7 x weekly - 2 sets - 10 reps    Charges:        Total Timed Treatment: 45 min  Total Treatment Time: 45 min

## 2024-12-18 ENCOUNTER — APPOINTMENT (OUTPATIENT)
Dept: PHYSICAL THERAPY | Age: 24
End: 2024-12-18
Attending: PODIATRIST
Payer: COMMERCIAL

## 2024-12-18 ENCOUNTER — OFFICE VISIT (OUTPATIENT)
Dept: PHYSICAL THERAPY | Age: 24
End: 2024-12-18
Attending: PODIATRIST
Payer: COMMERCIAL

## 2024-12-18 PROCEDURE — 97110 THERAPEUTIC EXERCISES: CPT

## 2024-12-18 PROCEDURE — 97140 MANUAL THERAPY 1/> REGIONS: CPT

## 2024-12-18 NOTE — PROGRESS NOTES
Diagnosis:   Ankle Post-op condition      Referring Provider: Jonny Lewis  Date of Evaluation:    12/06/2024    Precautions:  None Next MD visit:   none scheduled  Date of Surgery: n/a   Insurance Primary/Secondary: [place] / N/A     # Auth Visits: 8            Subjective: The patient reported that he has pain and stiffness in the morning but its decreasing. Pt reported walking 13k steps with brace on. Had to keep the brace on for the entire day. Couldn't do the exercises yesterday as he was at work and he felt that walking was not difficult. Pain felt only at the ankle joint anterior aspect.  Pain: 0/10. Stiffness: Mild   Objective: Ankle tight in all movements except inversion. DF> Eversion > PF. Strength 4/5. DF ROM is 10'. Strength 4/5  There is mild swelling in R ankle just at the anterior and lateral margins of the joint.      Assessment: Patient tolerated the glides, grade 2 and 3 well with no pain. All the exercises were performed without pain. Weight shifts were performed in the // bars.  Goals:   Same as set  in evaluation    Plan: continue treatment as planned in the initial visist  Date: 12/9/2024  TX#: 2/8 Date:  12/16/2024              TX#: 3/8 Date:                 TX#: 4/ Date:                 TX#: 5/ Date:   Tx#: 6/   Manual mobilization of ankle in DF, PF and Eversion. Grade 2, 3 and 4 Manual mobilization of ankle in DF, PF and Eversion. Grade 1 and 2 Manual mobilization of ankle in DF, PF and Eversion. Grade 2 and 3     Hold relax for ankle strengthening in DF, PF and eversion Lymphatic drainage of the ankle to reduce swelling RTB Resisted ankle movements   10 X 5 sec hold x 2 sets     Towel scrunches Towel scrunches 1min Towel scrunches 1min     RTB resisted ankle movements   10 X 5 sec hold x 2 sets YTB Resisted ankle movements   10 X 5 sec hold x 2 sets RTB Resisted ankle movements   10 X 5 sec hold x 2 sets     Proprioception  Foam sheet  marching x 5 min  Proprioception  Ankle disc x 5  min  Abduction at hip   Red TB  10 reps x 5 sec hold x 2 sets      Abduction at hip   Red TB  10 reps x 5 sec hold x 2 sets X B/L Abduction at hip   Red TB  10 reps x 5 sec hold x 2 sets X B/L Balance board exercises x 2 min.  All directions     HEP: Access Code: [email or web address]  URL: [email or web address]  Date: 12/09/2024  Prepared by: Nayeli Farooq    Exercises  - Mini Squat with Counter Support  - 1 x daily - 7 x weekly - 2 sets - 10 reps  - Seated Heel Raise  - 1 x daily - 7 x weekly - 2 sets - 10 reps  - Long Sitting Ankle Plantar Flexion with Resistance  - 1 x daily - 7 x weekly - 2 sets - 10 reps  - Long Sitting Ankle Eversion with Resistance  - 1 x daily - 7 x weekly - 2 sets - 10 reps  - Long Sitting Ankle Inversion with Resistance  - 1 x daily - 7 x weekly - 2 sets - 10 reps  - Long Sitting Ankle Dorsiflexion with Anchored Resistance  - 1 x daily - 7 x weekly - 2 sets - 10 reps  - Towel Scrunches  - 1 x daily - 7 x weekly - 2 sets - 10 reps  - Standing Hamstring Stretch with Step  - 1 x daily - 7 x weekly - 2 sets - 10 reps  - Standing Weight Shift  - 1 x daily - 7 x weekly - 2 sets - 10 reps  - Clamshell with Resistance  - 1 x daily - 7 x weekly - 2 sets - 10 reps    Charges:        Total Timed Treatment: 45 min  Total Treatment Time: 45 min

## 2025-01-08 ENCOUNTER — APPOINTMENT (OUTPATIENT)
Dept: PHYSICAL THERAPY | Age: 25
End: 2025-01-08
Attending: PODIATRIST
Payer: COMMERCIAL

## 2025-01-13 ENCOUNTER — OFFICE VISIT (OUTPATIENT)
Dept: PHYSICAL THERAPY | Age: 25
End: 2025-01-13
Attending: PODIATRIST
Payer: COMMERCIAL

## 2025-01-13 PROCEDURE — 97110 THERAPEUTIC EXERCISES: CPT

## 2025-01-13 PROCEDURE — 97140 MANUAL THERAPY 1/> REGIONS: CPT

## 2025-01-14 NOTE — PROGRESS NOTES
Diagnosis:   Ankle Post-op condition      Referring Provider: Jonny Lewis  Date of Evaluation:    12/06/2024    Precautions:  None Next MD visit:   none scheduled  Date of Surgery: n/a   Insurance Primary/Secondary: [place] / N/A     # Auth Visits: 8            Subjective: The patient reported that he has been up and about on the feet and has been walking a lot. He reports that he has sharp  pain in his R ankle with walking on his toes  Pain: 3/10. Stiffness: Mild only in PF   Objective: DF ankle ROM WFL same for both R and L.  PROM of the ankle is complete and no pain was reported. The endfeel in R ankle movement was springy showing mild tightness in PF. Eversion MMT was weaker in R foot. Other movements were same as the uninvolved site.  Assessment: Patient tolerated the glides, grade 3 well with no pain. All the exercises were performed without pain. Weight shifts  and proprioception exercises were performed well.  Goals:   Same as set  in evaluation    Plan: continue treatment as planned in the initial visist  Date: 12/9/2024  TX#: 2/8 Date:  12/16/2024              TX#: 3/8 Date:                 TX#: 4/ Date: 1/13/2025                TX#: 5/8 Date:   Tx#: 6/   Manual mobilization of ankle in DF, PF and Eversion. Grade 2, 3 and 4 Manual mobilization of ankle in DF, PF and Eversion. Grade 1 and 2 Manual mobilization of ankle in DF, PF and Eversion. Grade 2 and 3 Manual mob of the ankle R in the PF and Eversion. Grade 3    Hold relax for ankle strengthening in DF, PF and eversion Lymphatic drainage of the ankle to reduce swelling RTB Resisted ankle movements   10 X 5 sec hold x 2 sets   RTB Resisted ankle movements   10 X 5 sec hold x 2 sets    Towel scrunches Towel scrunches 1min Towel scrunches 1min Balance board exercises    RTB resisted ankle movements   10 X 5 sec hold x 2 sets YTB Resisted ankle movements   10 X 5 sec hold x 2 sets RTB Resisted ankle movements   10 X 5 sec hold x 2 sets Proprioception  exercises for ankle stability    Proprioception  Foam sheet  marching x 5 min  Proprioception  Ankle disc x 5 min  Abduction at hip   Red TB  10 reps x 5 sec hold x 2 sets  Arch formation exercises  Toe yoga  Heel raises      Abduction at hip   Red TB  10 reps x 5 sec hold x 2 sets X B/L Abduction at hip   Red TB  10 reps x 5 sec hold x 2 sets X B/L Balance board exercises x 2 min.  All directions Clam jalil  RTB    HEP: Access Code: [email or web address]  URL: [email or web address]  Date: 12/09/2024  Prepared by: Nayeli Farooq    Exercises  - Mini Squat with Counter Support  - 1 x daily - 7 x weekly - 2 sets - 10 reps  - Seated Heel Raise  - 1 x daily - 7 x weekly - 2 sets - 10 reps  - Long Sitting Ankle Plantar Flexion with Resistance  - 1 x daily - 7 x weekly - 2 sets - 10 reps  - Long Sitting Ankle Eversion with Resistance  - 1 x daily - 7 x weekly - 2 sets - 10 reps  - Long Sitting Ankle Inversion with Resistance  - 1 x daily - 7 x weekly - 2 sets - 10 reps  - Long Sitting Ankle Dorsiflexion with Anchored Resistance  - 1 x daily - 7 x weekly - 2 sets - 10 reps  - Towel Scrunches  - 1 x daily - 7 x weekly - 2 sets - 10 reps  - Standing Hamstring Stretch with Step  - 1 x daily - 7 x weekly - 2 sets - 10 reps  - Standing Weight Shift  - 1 x daily - 7 x weekly - 2 sets - 10 reps  - Clamshell with Resistance  - 1 x daily - 7 x weekly - 2 sets - 10 reps    Charges:        Total Timed Treatment: 45 min  Total Treatment Time: 45 min

## 2025-01-15 ENCOUNTER — OFFICE VISIT (OUTPATIENT)
Dept: PHYSICAL THERAPY | Age: 25
End: 2025-01-15
Attending: PODIATRIST
Payer: COMMERCIAL

## 2025-01-15 PROCEDURE — 97140 MANUAL THERAPY 1/> REGIONS: CPT

## 2025-01-15 PROCEDURE — 97110 THERAPEUTIC EXERCISES: CPT

## 2025-01-16 NOTE — PROGRESS NOTES
Diagnosis:   Ankle Post-op condition      Referring Provider: Jonny Lewis  Date of Evaluation:    12/06/2024    Precautions:  None Next MD visit:   none scheduled  Date of Surgery: n/a   Insurance Primary/Secondary: [place] / N/A     # Auth Visits: 8            Subjective: The patient reported that there was no pain in the ankle. Less tightness after the last visit. The pt reported that he feels weak in the front of the ankle when he tries to do movements of the ankle. The sharp pain with the toes walking is not there.  Pain: 0/10. Stiffness: Mild only in PF 4/10, NO PAIN  Objective: DF ankle ROM WFL same for both R and L.  PROM of the ankle is complete and no pain was reported. The endfeel in R ankle movement was springy showing mild tightness in PF. Eversion MMT was weaker in R foot. Other movements were same as the uninvolved site.  Assessment: Patient tolerated the glides, grade 3/4 well with no pain. All the exercises were performed without pain. Weight shifts  and proprioception exercises were performed well.  Goals:   Same as set  in evaluation    Plan: continue treatment as planned in the initial visist  Date: 12/9/2024  TX#: 2/8 Date:  12/16/2024              TX#: 3/8 Date:                 TX#: 4/ Date: 1/13/2025                TX#: 5/8 Date: 1/15/2025  Tx#: 6/8   Manual mobilization of ankle in DF, PF and Eversion. Grade 2, 3 and 4 Manual mobilization of ankle in DF, PF and Eversion. Grade 1 and 2 Manual mobilization of ankle in DF, PF and Eversion. Grade 2 and 3 Manual mob of the ankle R in the PF and Eversion. Grade 3 Manual mob of the ankle R in the PF and Eversion. Grade 3 and 4   Hold relax for ankle strengthening in DF, PF and eversion Lymphatic drainage of the ankle to reduce swelling RTB Resisted ankle movements   10 X 5 sec hold x 2 sets   RTB Resisted ankle movements   10 X 5 sec hold x 2 sets RTB Resisted ankle movements   10 X 5 sec hold x 2 sets   Towel scrunches Towel scrunches 1min  Towel scrunches 1min Balance board exercises Balance board exercises  SLS    RTB resisted ankle movements   10 X 5 sec hold x 2 sets YTB Resisted ankle movements   10 X 5 sec hold x 2 sets RTB Resisted ankle movements   10 X 5 sec hold x 2 sets Proprioception exercises for ankle stability Proprioception exercises for ankle stability  Foam walking   Proprioception  Foam sheet  marching x 5 min  Proprioception  Ankle disc x 5 min  Abduction at hip   Red TB  10 reps x 5 sec hold x 2 sets  Arch formation exercises  Toe yoga  Heel raises   Monster walks  Deep squats  Lunges     Abduction at hip   Red TB  10 reps x 5 sec hold x 2 sets X B/L Abduction at hip   Red TB  10 reps x 5 sec hold x 2 sets X B/L Balance board exercises x 2 min.  All directions Clam shells  RTB    HEP: Access Code: [email or web address]  URL: [email or web address]  Date: 12/09/2024  Prepared by: Nayeli Farooq    Exercises  - Mini Squat with Counter Support  - 1 x daily - 7 x weekly - 2 sets - 10 reps  - Seated Heel Raise  - 1 x daily - 7 x weekly - 2 sets - 10 reps  - Long Sitting Ankle Plantar Flexion with Resistance  - 1 x daily - 7 x weekly - 2 sets - 10 reps  - Long Sitting Ankle Eversion with Resistance  - 1 x daily - 7 x weekly - 2 sets - 10 reps  - Long Sitting Ankle Inversion with Resistance  - 1 x daily - 7 x weekly - 2 sets - 10 reps  - Long Sitting Ankle Dorsiflexion with Anchored Resistance  - 1 x daily - 7 x weekly - 2 sets - 10 reps  - Towel Scrunches  - 1 x daily - 7 x weekly - 2 sets - 10 reps  - Standing Hamstring Stretch with Step  - 1 x daily - 7 x weekly - 2 sets - 10 reps  - Standing Weight Shift  - 1 x daily - 7 x weekly - 2 sets - 10 reps  - Clamshell with Resistance  - 1 x daily - 7 x weekly - 2 sets - 10 reps    Charges:        Total Timed Treatment: 45 min  Total Treatment Time: 45 min

## 2025-01-22 ENCOUNTER — APPOINTMENT (OUTPATIENT)
Dept: PHYSICAL THERAPY | Age: 25
End: 2025-01-22
Attending: PODIATRIST
Payer: COMMERCIAL

## 2025-01-27 ENCOUNTER — TELEPHONE (OUTPATIENT)
Dept: PHYSICAL THERAPY | Facility: HOSPITAL | Age: 25
End: 2025-01-27

## 2025-01-27 ENCOUNTER — APPOINTMENT (OUTPATIENT)
Dept: PHYSICAL THERAPY | Age: 25
End: 2025-01-27
Attending: PODIATRIST
Payer: COMMERCIAL

## 2025-01-27 ENCOUNTER — OFFICE VISIT (OUTPATIENT)
Dept: PHYSICAL THERAPY | Age: 25
End: 2025-01-27
Attending: PODIATRIST
Payer: COMMERCIAL

## 2025-01-27 DIAGNOSIS — Z98.890 HISTORY OF ANKLE SURGERY: Primary | ICD-10-CM

## 2025-01-27 PROCEDURE — 97140 MANUAL THERAPY 1/> REGIONS: CPT

## 2025-01-27 PROCEDURE — 97110 THERAPEUTIC EXERCISES: CPT

## 2025-01-27 NOTE — PROGRESS NOTES
Diagnosis:   Ankle Post-op condition      Referring Provider: Jonny Lewis  Date of Evaluation:    12/06/2024    Precautions:  None Next MD visit:   none scheduled  Date of Surgery: n/a   Insurance Primary/Secondary: [place] / N/A     # Auth Visits: 8            Subjective: The patient reported that he doesn't feel much pain but the pain come with begining of the day and patient hasn't moved much but after the exercises and walking a bit he feels that it opens up more.   Weakness  6/10 if compared to L being 10/10  Pain: 0/10. Stiffness: Mild only in PF 1/10.  Objective: DF ankle ROM WFL same for both R and L.  PROM of the ankle is complete and no pain was reported. The endfeel in R ankle movement was springy showing mild tightness in PF. MMT for all ankle muscle groups, 5/5  Assessment: Patient tolerated the glides, grade 3/4 well with no pain. All the exercises were performed without pain. Weight shifts  and proprioception exercises were performed well.  Goals:   Same as set  in evaluation    Plan: continue treatment with patient to be seen for 4 more visits 1/week  Date: 12/9/2024  TX#: 2/8 Date:  12/16/2024              TX#: 3/8 Date:                 TX#: 4/ Date: 1/13/2025                TX#: 5/8 Date: 1/15/2025  Tx#: 6/8 Date: 1/27/2025  Tx#7/8   Manual mobilization of ankle in DF, PF and Eversion. Grade 2, 3 and 4 Manual mobilization of ankle in DF, PF and Eversion. Grade 1 and 2 Manual mobilization of ankle in DF, PF and Eversion. Grade 2 and 3 Manual mob of the ankle R in the PF and Eversion. Grade 3 Manual mob of the ankle R in the PF and Eversion. Grade 3 and 4 Manual mob of the ankle R in the PF and Eversion. Grade 3 and 4   Hold relax for ankle strengthening in DF, PF and eversion Lymphatic drainage of the ankle to reduce swelling RTB Resisted ankle movements   10 X 5 sec hold x 2 sets   RTB Resisted ankle movements   10 X 5 sec hold x 2 sets RTB Resisted ankle movements   10 X 5 sec hold x 2 sets  GTB Resisted ankle movements x20 reps   Towel scrunches Towel scrunches 1min Towel scrunches 1min Balance board exercises Balance board exercises  SLS  Balance board exercises  SLS    RTB resisted ankle movements   10 X 5 sec hold x 2 sets YTB Resisted ankle movements   10 X 5 sec hold x 2 sets RTB Resisted ankle movements   10 X 5 sec hold x 2 sets Proprioception exercises for ankle stability Proprioception exercises for ankle stability  Foam walking Monster walks GTB  Deep squats Single leg  Lunges GTB   Proprioception  Foam sheet  marching x 5 min  Proprioception  Ankle disc x 5 min  Abduction at hip   Red TB  10 reps x 5 sec hold x 2 sets  Arch formation exercises  Toe yoga  Heel raises   Monster walks  Deep squats  Lunges   Single leg Calf raises  Wall lean toe raises.   Abduction at hip   Red TB  10 reps x 5 sec hold x 2 sets X B/L Abduction at hip   Red TB  10 reps x 5 sec hold x 2 sets X B/L Balance board exercises x 2 min.  All directions Clam shells  RTB     HEP: Access Code: [email or web address]  URL: [email or web address]  Date: 01/27/2025  Prepared by: Nayeli Farooq    Exercises  - Long Sitting Ankle Inversion with Resistance  - 1 x daily - 7 x weekly - 2 sets - 10 reps  - Long Sitting Ankle Dorsiflexion with Anchored Resistance  - 1 x daily - 7 x weekly - 2 sets - 10 reps  - Clamshell with Resistance  - 1 x daily - 7 x weekly - 2 sets - 10 reps  - Single Leg Stance  - 1 x daily - 7 x weekly - 1 sets - 5 reps - 15s hold  - Forward T  - 1 x daily - 7 x weekly - 2 sets - 10 reps - 3s hold  - Lateral T  - 1 x daily - 7 x weekly - 2 sets - 10 reps - 3s hold  - Walking Forward Lunge  - 1 x daily - 7 x weekly - 2 sets - 20 reps - no hold  - Toe Raise With Back Against Wall  - 1 x daily - 7 x weekly - 3 sets - 10 reps  - Single Leg Squat with Chair Touch  - 1 x daily - 7 x weekly - 1 sets - 10 reps - no hold  Charges:   1 manual, 2 [employer]     Total Timed Treatment: 45 min  Total Treatment Time: 45  min

## 2025-05-07 NOTE — PROGRESS NOTES
[facility]  Progress Note    Lito Rosen is a 24 year old male.   Chief Complaint   Patient presents with    New Patient     Referred by Dr Lorenzo, right ankle - xrays taken on 6/28/24. Patient states that the pain is all around the ankle. Walking down the stairs 4/10, 2 mile run will be pain for days 7-8/10, today in clinic 1/10. Denies any numbness or tingling, does have a little swelling.         HPI:     Patient is a pleasant 24-year-old male who is presenting to clinic today for evaluation of his right ankle.  Patient recently was seen by Dr. Lorenzo, who referred patient to myself for evaluation due to an OCD lesion in his right ankle.  Patient states that he had an incident in March 2023 while playing soccer when him and another player attempted to kick the ball simultaneously.  He states that they both hit the ball but he had significant ankle pain following the incident.  States that he was unable to bear weight for about a month.  He states that he was initially diagnosed with a soft tissue injury.  States that he unfortunately continues to have intermittent pain.  He states that he does have discomfort walking down the stairs, as well as with any increase in activity.  Rates his pain anywhere from 4-8/10.  Clinic today, he rates his pain 1/10.  He does state that he gets occasional swelling as well.  Numbness or tingling.  Patient has tried physical therapy with minimal relief, as well as activity modification and protected weightbearing in a cam boot.  Patient had an MRI in January of this year that revealed an OCD lesion.  He is here today for further evaluation and care.      Allergies: Patient has no known allergies.   No current outpatient medications on file.      Past Medical History:    Allergic rhinitis due to pollen    Chondromalacia of patella    Flat foot(734)      Past Surgical History:   Procedure Laterality Date    Other surgical history      STRICTURE IN INTESTINES AT 1YRS OLD       Family History   Problem Relation Age of Onset    Hypertension Mother     Hypertension Maternal Grandfather     Cancer Neg     Heart Disease Neg     Stroke Neg       Social History     Socioeconomic History    Marital status: Single   Tobacco Use    Smoking status: Never    Smokeless tobacco: Never   Vaping Use    Vaping status: Never Used   Substance and Sexual Activity    Alcohol use: No    Drug use: No           REVIEW OF SYSTEMS:     10 point ROS completed and was negative, except for pertinent positive and negatives stated in subjective.       EXAM:     Lower extremity focused exam:  GENERAL: well developed, well nourished, in no apparent distress  EXTREMITIES:  1. Integument: Skin appears moist, warm, and supple with positive hair growth. There are no color changes. No open lesions. No macerations. No Hyperkeratotic lesions.   2. Vascular: Dorsalis pedis 2/4 bilateral and posterior tibial pulses 2/4 bilateral, capillary refill normal.  3. Neurological: Gross sensation intact via light touch bilaterally.  Normal sharp/dull sensation  4. Musculoskeletal: All muscle groups are graded 5/5 in the foot and ankle.  Patient does have tenderness with palpation to the lateral anterior and medial ankle gutters anteriorly, as well as pain with palpation deep to posterior lateral aspect of the ankle.  Patient does have pain is activated at end ROM dorsiflexion plantarflexion of the ankle.  Patient does have laxity with anterior drawer test bilaterally.  No pain elicited on exam.  No pain with palpation to lateral ankle ligaments.    PROCEDURE:  XR ANKLE (MIN 3 VIEWS), RIGHT (CPT=73610)      TECHNIQUE:  Three views were obtained.      COMPARISON:  [facility] XR, XR ANKLE (MIN 3 VIEWS), RIGHT (CPT=73610), 5/08/2015, 10:43 AM.      INDICATIONS:  M25.571 Right ankle pain, unspecified chronicity      PATIENT STATED HISTORY: (As transcribed by Technologist)  Pt was injured 1 year ago in a soccer game. He c/o lateral and  medial pain and swelling since when doing activties like running or playing soccer.          FINDINGS:  Ankle mortise is intact.  No acute fracture or dislocation is seen.  There is normal alignment.  Normal bone mineral density.  If clinical symptoms persist then recommend follow-up imaging.       PROCEDURE:  MRI ANKLE, RIGHT (CPT=73721)     COMPARISON:  None.     INDICATIONS:  G89.29 Chronic pain of right ankle M25.571 Chronic pain of right ankle     TECHNIQUE:  Multiplanar imaging of the ankle is acquired including axial, sagittal and coronal imaging. Proton density, T2 weighted and fat suppression sequences are included.  Exam was performed without intravenous gadolinium contrast.     PATIENT STATED HISTORY: (As transcribed by Technologist)  Patient complains of chronic right ankle pain post soccer injury 3/2023.         FINDINGS:    JOINT COMPARTMENTS:  There is a geographic area of signal alteration within the posterolateral aspect of the talar dome involving the articular surface, the process measuring 1.4 x 0.9 cm in cross-section and 1.7 cm in craniocaudal length (series 4,  image 14, series 6, image 14).  This process demonstrates linear T1 hypointense borders with a central area of PD/stir signal hyperintensity.  There is subtle irregularity of the overlying subchondral bone plate and cartilage without discrete chondral  defect.  Mild marrow edema noted broadly throughout the talar body which may be related to trauma and/or some component of stress injury from altered weight-bearing.  Joint spaces are preserved.  Small tibiotalar and subtalar joint effusions noted.  MUSCLULATURE:  No strain, edema, or atrophy.  TENDONS:  Achilles, lateral peroneal tendons, medial and anterior tendons are intact without significant tendinosis or tears.  No Achilles paratendinitis.  LIGAMENTS:  Syndesmotic ligaments, ATF, PTF, CF, deltoid, and spring ligaments are intact without significant sprain.  SINUS TARSI:  Normal fat  signal of the tarsal sinus.  PLANTAR FASCIA:  Central and lateral cords are intact.  No fasciitis identified.         Impression   CONCLUSION:       1. Geographic area of signal alteration within the posterolateral aspect of the talar dome involving the articular surface, as described above.  Given subtle irregularity of the overlying subchondral bone plate and cartilage this is favored to represent  a chronic osteochondral injury.  Less likely considerations include healing talar body fracture and talar osteonecrosis.     2. Mild marrow edema broadly throughout the talar body.  This may be secondary to trauma versus stress injury related to altered weight-bearing.             ASSESSMENT AND PLAN:   Diagnoses and all orders for this visit:    Osteochondral defect of talus    Right ankle effusion    Right ankle pain, unspecified chronicity        Plan:   -Patient was seen and evaluated today in clinic.  Chart history reviewed.    Was able to review patient's most recent radiographs and his MRI from January 2024, which does reveal what appears to be an osteochondral injury that is chronic in nature to the posterior lateral aspect of the right talus.  The talus itself and the MRI does have marrow changes.  See above for impression    Discussed both imaging and clinical findings with patient today.    Treatment options discussed, both conservative and surgical.  Patient has tried several conservative options and continues to have pain with increased activity.    Conservatively, can look into bracing options for patient, as well as activity modifications to avoid anything that causes pain.  Patient states that he is wanting to get back to being more physically active.    Patient does elect to discuss surgical options.  Based on findings, I would recommend an ankle arthroscopy with extensive debridement and OCD repair versus subchondroplasty of the talus.    Because patient's MRI was 7 months ago and he continues to  increase physical activity, I would recommend an updated MRI to further evaluate patient's ankle and talus of the right lower extremity.      Will have patient follow-up after the MRI to further discuss surgical treatment at that time.    In the meantime, I recommend patient rest, ice, and elevate, and avoid any activities that increase pain.    -The patient indicates understanding of these issues and agrees to the plan.    Time spent reviewing pertinent information from patient's chart, reviewing any pertinent imaging, obtaining history and physical exam, discussing and mutually agreeing on a treatment plan, and documenting encounter: 40 minutes    RTC after MRI to discuss surgical options      Calixto Lewis DPM        8/21/2024    Dragon speech recognition software was used to prepare this note.  Errors in word recognition may occur.  Please contact me with any questions/concerns with this note.    0 = understands/communicates without difficulty

## (undated) NOTE — LETTER
Patient Name: Lito Rosen  YOB: 2000          MRN :  [id number]  Date:  2024  Referring Physician:  Julita Garland    Discharge Summary  Pt has attended 8 visits in Physical Therapy.       Diagnosis:   Chronic pain of right ankle (M25.571,G89.29)     Referring Provider: Julita Garland  Date of Evaluation:    2024    Precautions:  None Next MD visit: Aug 21st  Date of Surgery: n/a   Insurance Primary/Secondary: [employer] [place] HMO / N/A     # Auth Visits: 8 POC            Subjective: The patient reports that things are good. He saw the surgeon who ordered another MRI. He scheduled it for next Wednesday. Then after that he will figure out when the surgery is. He has had more pain recently. He hasn't been doing much activity other than the exercises. He is having pain during the exercises, he always did but it was nothing terrible. Now he is just having pain at rest. The surgeon recommended surgery unless if he wants to continue how he is currently. He said that he would fill something in with. After the exercises he feels more flexible, but that doesn't change the walking around. Will make today the last session since he will get the MRI and then see the surgeon again.     Pain: 0/10, pain 2-3/10 with knee to wall test.      Objective:         8  Knee to wall: 5 cm, 6.5 after talar glides on medial aspect  Talar AP glide is hypomobile, moreso when mobilized on the medial aspect of the talus.       : knee to wall test 6 cm, 2-3/10      Knee to wall:   R: 7 cm, L 9.5 cm      MAGUE test: (tandem R in back)  Double leg firm surface: 0  Single leg firm surface: R 7; L 11   Tandem: 4  On Airex  Double le  Single leg: R 11; L 13  Tandem: 6      Assessment: Will have Lito hold on the single leg heel raises as he is having pain with this. Assessed the MAGUE test to further evaluate the static balance on firm and compliant surface. The patient had increased difficulty balancing on the airex pad,  especially in the narrow base of support. Will plan to discharge at this time as the patient pursues surgical options for his ankle pain.         Goals:   (to be met in 8 visits)  The patient will report being able to ascend stairs without limitation MET, pain with going down stairs  The patient will report being able to exercise including walking and biking without limitation NOT MET  The patient will report being able to jog >1 mile while maintaining a pain level <4/10 NOT MET  The patient will demonstrate a knee to wall DF test that is within 1 cm of contralateral side   The patient will demonstrate a R SL heel raise that is at least 18 repetitions NOT TESTED  The patient will report being able to return to playing soccer  NOT MET  The patient will be independent and adherent in a comprehensive HEP          Plan: the patient will be discharged from this plan of care for physical therapy at this time.     Patient/Family/Caregiver was advised of these findings, precautions, and treatment options and has agreed to actively participate in planning and for this course of care.    Thank you for your referral. If you have any questions, please contact me at Dept: [phone].    Sincerely,  Electronically signed by therapist: Malorie Lyman, PT     Certification From: 9/11/2024  To:12/10/2024            21st Century Cures Act Notice to Patient: Medical documents like this are made available to patients in the interest of transparency. However, be advised this is a medical document and it is intended as [id number] communication between your medical providers. This medical document may contain abbreviations, assessments, medical data, and results or other terms that are unfamiliar. Medical documents are intended to carry relevant information, facts as evident, and the clinical opinion of the practitioner. As such, this medical document may be written in language that appears blunt or direct. You are encouraged to contact  your medical provider and/or [facility] if you have any questions about this medical document.

## (undated) NOTE — ED AVS SNAPSHOT
[facility]  [address]    [address] [place] [address]    Phone:  [phone]    Fax:  [phone]           Lizzie Lm   MRN: [id number]    Department:  THE [facility]   Date of Visit:  3/16 Expect to receive an electronic request (by e-mail or text) to complete a self-assessment the day after your visit. You may also receive a call from our patient liason soon after your visit.  Also, some patients receive a detailed feedback survey mailed to [place] [id number]  ([address]) [address] [facility] [phone] [address] ([address] [place] [address]) [phone] [address] [place] [address] [address] [facility] PATIENT STATED HISTORY:  1 week ago , while playing soccer, right 1st toe was jammed. Pain and swelling to medial side of right 1st toe. FINDINGS:  No fracture, dislocation or osseous abnormality.

## (undated) NOTE — LETTER
10/11/2024          To Whom It May Concern:    Lito CHANDLER Jessika is currently under my medical care and will be scheduling surgery in near future. Patient will be non weightbearing and unable to drive for at least 4 weeks after surgery.    If you require additional information please contact our office.        Sincerely,    Calixto Lewis DPM

## (undated) NOTE — LETTER
Patient Name: Keith Jeter  YOB: 2000          MRN number:  [id number]  Date:  6/3/2020  Referring Physician:  Tiara Campuzano    LOWER EXTREMITY EVALUATION:   Referring Physician: Dr. Donnie Dennis  Diagnosis: R Hamstring Injury     Date of [employer] Are you being hurt, frightened, demeaned, or taken advantage of by anyone at your home or in your life? No  Have you recently had thoughts of hurting yourself?   No    Have you tried to hurt yourself in the past?  No        Teddy Mccarty describes prior level of fu Piriformis: R WNL; L WNL    Strength/MMT: (* denotes performed with pain)  Hip Knee   Flexion: R 4+/5; L 5/5  Extension: R 2/5; L 5/5  Abduction: R 5/5; L 5/5  ER: R 5/5; L 5/5  IR: R 5/5; L 5/5 Flexion: R 3/5; L 5/5  Extension: R 5/5; L 5/5        Special thigh pain to cross the street safely    Frequency / Duration: Patient will be seen for 2-3 x/week or a total of 16 visits over a 90 day period.  Treatment will include: Gait training, Manual Therapy, Neuromuscular Re-education, Therapeutic Activities, Ther

## (undated) NOTE — LETTER
Patient Name: Román Vergara  YOB: 2000          MRN number:  [id number]  Date:  8/29/2017  Referring Physician:  Kenisha Cisneros             Dx: R ankle pain         Authorized # of Visits:           Next MD visit: none scheduled  Fall Risk: s

## (undated) NOTE — LETTER
Patient Name: Power Hansen  YOB: 2000          MRN :  [id number]  Date:  6/25/2020  Referring Physician:  Bryanna Styles    Progress Summary  Pt has attended 7 visits in Physical Therapy.      Dx: R Hamstring Injury         Insurance Suad Thibodeaux 1. The patient will demonstrate hip flexion AROM that is at least 90 degrees on the R side MET  2. The patient will demonstrate the ability to perform prone hip extension AROM MET  3.  The patient will demonstrate prone knee flexion strength on R that is 5/